# Patient Record
Sex: FEMALE | Race: BLACK OR AFRICAN AMERICAN | NOT HISPANIC OR LATINO | Employment: FULL TIME | URBAN - METROPOLITAN AREA
[De-identification: names, ages, dates, MRNs, and addresses within clinical notes are randomized per-mention and may not be internally consistent; named-entity substitution may affect disease eponyms.]

---

## 2019-10-22 ENCOUNTER — HISTORICAL (OUTPATIENT)
Dept: RADIOLOGY | Facility: HOSPITAL | Age: 59
End: 2019-10-22

## 2020-10-28 ENCOUNTER — HISTORICAL (OUTPATIENT)
Dept: RADIOLOGY | Facility: HOSPITAL | Age: 60
End: 2020-10-28

## 2021-11-03 ENCOUNTER — HISTORICAL (OUTPATIENT)
Dept: RADIOLOGY | Facility: HOSPITAL | Age: 61
End: 2021-11-03

## 2022-03-16 ENCOUNTER — HISTORICAL (OUTPATIENT)
Dept: LAB | Facility: HOSPITAL | Age: 62
End: 2022-03-16

## 2022-03-16 LAB
ABS NEUT (OLG): 2.54 (ref 2.1–9.2)
BASOPHILS # BLD AUTO: 0.1 10*3/UL (ref 0–0.2)
BASOPHILS NFR BLD AUTO: 1 %
CHOLEST SERPL-MCNC: 201 MG/DL
CHOLEST/HDLC SERPL: 4 {RATIO} (ref 0–5)
CRP SERPL-MCNC: 1.4 MG/L
EOSINOPHIL # BLD AUTO: 0.1 10*3/UL (ref 0–0.9)
EOSINOPHIL NFR BLD AUTO: 2 %
ERYTHROCYTE [DISTWIDTH] IN BLOOD BY AUTOMATED COUNT: 14.1 % (ref 11.5–14.5)
ERYTHROCYTE [SEDIMENTATION RATE] IN BLOOD: 23 MM/H (ref 0–20)
EST. AVERAGE GLUCOSE BLD GHB EST-MCNC: 111.2 MG/DL
FLAG2 (OHS): 70
FLAG3 (OHS): 80
FLAGS (OHS): 70
GLUCOSE P FAST SERPL-MCNC: 99 MG/DL (ref 70–115)
HBA1C MFR BLD: 5.5 %
HCT VFR BLD AUTO: 43.7 % (ref 35–46)
HDLC SERPL-MCNC: 56 MG/DL (ref 35–60)
HGB BLD-MCNC: 14.7 G/DL (ref 12–16)
IMM GRANULOCYTES # BLD AUTO: 0.02 10*3/UL
IMM GRANULOCYTES NFR BLD AUTO: 0 %
IMM. NE 2 SUSPECT FLAG (OHS): 10
LDLC SERPL CALC-MCNC: 130 MG/DL (ref 50–140)
LOW EVENT # SUSPECT FLAG (OHS): 70
LYMPHOCYTES # BLD AUTO: 3.5 10*3/UL (ref 0.6–4.6)
LYMPHOCYTES NFR BLD AUTO: 53 %
MANUAL DIFF? (OHS): NO
MCH RBC QN AUTO: 32.9 PG (ref 26–34)
MCHC RBC AUTO-ENTMCNC: 33.6 G/DL (ref 31–37)
MCV RBC AUTO: 97.8 FL (ref 80–100)
MO BLASTS SUSPECT FLAG (OHS): 70
MONOCYTES # BLD AUTO: 0.4 10*3/UL (ref 0.1–1.3)
MONOCYTES NFR BLD AUTO: 6 %
NEUTROPHILS # BLD AUTO: 2.54 10*3/UL (ref 2.1–9.2)
NEUTROPHILS NFR BLD AUTO: 38 %
NRBC BLD AUTO-RTO: 0 % (ref 0–0.2)
PLATELET # BLD AUTO: 237 10*3/UL (ref 130–400)
PLATELET CLUMPS SUSPECT FLAG (OHS): 20
PMV BLD AUTO: 10.1 FL (ref 7.4–10.4)
RBC # BLD AUTO: 4.47 10*6/UL (ref 4–5.2)
TRIGL SERPL-MCNC: 76 MG/DL (ref 37–140)
VLDLC SERPL CALC-MCNC: 15 MG/DL
WBC # SPEC AUTO: 6.6 10*3/UL (ref 4.5–11)

## 2022-03-20 LAB
ANTINUCLEAR ANTIBODY SCREEN (OHS): NEGATIVE
CENTROMERE PROTEIN ANTIBODY (OHS): NEGATIVE
CENTROMERE QUANT (OHS): <0.4
DSDNA AB QUANT (OHS): 1.3
DSDNA ANTIBODY (OHS): NEGATIVE
JO-1 AB QUANT (OHS): <0.3
JO-1 ANTIBODY (OHS): NEGATIVE
RNP70 AB QUANT (OHS): 0.3
RNP70 ANTIBODY (OHS): NEGATIVE
SCL-70S AB QUANT (OHS): <0.6
SCLERODERMA (SCL-70S) ANTIBODY (OHS): NEGATIVE
SMITH AB QUANT (OHS): 1.2
SMITH DP IGG (OHS): NEGATIVE
SSA(RO) AB QUANT (OHS): 0.4
SSA(RO) ANTIBODY (OHS): NEGATIVE
SSB(LA) AB QUANT (OHS): 0.8
SSB(LA) ANTIBODY (OHS): NEGATIVE
U1RNP AB QUANT (OHS): 0.8
U1RNP ANTIBODY (OHS): NEGATIVE

## 2023-05-22 DIAGNOSIS — Z12.31 ENCOUNTER FOR SCREENING MAMMOGRAM FOR MALIGNANT NEOPLASM OF BREAST: Primary | ICD-10-CM

## 2023-06-26 ENCOUNTER — HOSPITAL ENCOUNTER (OUTPATIENT)
Dept: RADIOLOGY | Facility: HOSPITAL | Age: 63
Discharge: HOME | End: 2023-06-26
Attending: NURSE PRACTITIONER
Payer: COMMERCIAL

## 2023-06-26 DIAGNOSIS — Z12.31 ENCOUNTER FOR SCREENING MAMMOGRAM FOR MALIGNANT NEOPLASM OF BREAST: ICD-10-CM

## 2023-06-26 PROCEDURE — 77067 MAMMO DIGITAL SCREENING BILAT WITH TOMO: ICD-10-PCS | Mod: 26,,, | Performed by: RADIOLOGY

## 2023-06-26 PROCEDURE — 77063 BREAST TOMOSYNTHESIS BI: CPT | Mod: 26,,, | Performed by: RADIOLOGY

## 2023-06-26 PROCEDURE — 77067 SCR MAMMO BI INCL CAD: CPT | Mod: TC

## 2023-06-26 PROCEDURE — 77063 MAMMO DIGITAL SCREENING BILAT WITH TOMO: ICD-10-PCS | Mod: 26,,, | Performed by: RADIOLOGY

## 2023-06-26 PROCEDURE — 77067 SCR MAMMO BI INCL CAD: CPT | Mod: 26,,, | Performed by: RADIOLOGY

## 2023-06-28 ENCOUNTER — TELEPHONE (OUTPATIENT)
Dept: RADIOLOGY | Facility: HOSPITAL | Age: 63
End: 2023-06-28
Payer: MEDICAID

## 2023-06-28 NOTE — CARE UPDATE
Diagnostic imaging recommended on recent screening mammogram.  Paper ordering provider.  Faxed request for diagnostic imaging orders. Will contact patient to schedule imaging once order received.

## 2023-07-25 ENCOUNTER — HOSPITAL ENCOUNTER (OUTPATIENT)
Dept: RADIOLOGY | Facility: HOSPITAL | Age: 63
Discharge: HOME | End: 2023-07-25
Attending: NURSE PRACTITIONER
Payer: COMMERCIAL

## 2023-07-25 DIAGNOSIS — R92.8 ABNORMAL SCREENING MAMMOGRAM: ICD-10-CM

## 2023-07-25 PROCEDURE — 77065 DX MAMMO INCL CAD UNI: CPT | Mod: 26,LT,, | Performed by: STUDENT IN AN ORGANIZED HEALTH CARE EDUCATION/TRAINING PROGRAM

## 2023-07-25 PROCEDURE — 77061 BREAST TOMOSYNTHESIS UNI: CPT | Mod: TC,LT

## 2023-07-25 PROCEDURE — 76642 US BREAST LEFT LIMITED: ICD-10-PCS | Mod: 26,LT,, | Performed by: STUDENT IN AN ORGANIZED HEALTH CARE EDUCATION/TRAINING PROGRAM

## 2023-07-25 PROCEDURE — 76642 ULTRASOUND BREAST LIMITED: CPT | Mod: 26,LT,, | Performed by: STUDENT IN AN ORGANIZED HEALTH CARE EDUCATION/TRAINING PROGRAM

## 2023-07-25 PROCEDURE — 76642 ULTRASOUND BREAST LIMITED: CPT | Mod: TC,LT

## 2023-07-25 PROCEDURE — 77065 MAMMO DIGITAL DIAGNOSTIC LEFT WITH TOMO: ICD-10-PCS | Mod: 26,LT,, | Performed by: STUDENT IN AN ORGANIZED HEALTH CARE EDUCATION/TRAINING PROGRAM

## 2023-07-25 PROCEDURE — 77061 BREAST TOMOSYNTHESIS UNI: CPT | Mod: 26,LT,, | Performed by: STUDENT IN AN ORGANIZED HEALTH CARE EDUCATION/TRAINING PROGRAM

## 2023-07-25 PROCEDURE — 77061 MAMMO DIGITAL DIAGNOSTIC LEFT WITH TOMO: ICD-10-PCS | Mod: 26,LT,, | Performed by: STUDENT IN AN ORGANIZED HEALTH CARE EDUCATION/TRAINING PROGRAM

## 2023-08-08 ENCOUNTER — HOSPITAL ENCOUNTER (OUTPATIENT)
Dept: RADIOLOGY | Facility: HOSPITAL | Age: 63
Discharge: HOME | End: 2023-08-08
Attending: NURSE PRACTITIONER
Payer: COMMERCIAL

## 2023-08-08 DIAGNOSIS — R92.8 ABNORMAL FINDINGS ON DIAGNOSTIC IMAGING OF BREAST: ICD-10-CM

## 2023-08-08 DIAGNOSIS — R92.8 ABNORMAL FINDINGS ON DIAGNOSTIC IMAGING OF BREAST: Primary | ICD-10-CM

## 2023-08-08 PROCEDURE — 77061 BREAST TOMOSYNTHESIS UNI: CPT | Mod: 26,LT,, | Performed by: RADIOLOGY

## 2023-08-08 PROCEDURE — 19083 BX BREAST 1ST LESION US IMAG: CPT | Mod: LT,,, | Performed by: RADIOLOGY

## 2023-08-08 PROCEDURE — 19083 US BREAST BIOPSY WITH IMAGING 1ST SITE LEFT: ICD-10-PCS | Mod: LT,,, | Performed by: RADIOLOGY

## 2023-08-08 PROCEDURE — 77065 DX MAMMO INCL CAD UNI: CPT | Mod: 26,LT,, | Performed by: RADIOLOGY

## 2023-08-08 PROCEDURE — 77065 MAMMO DIGITAL DIAGNOSTIC LEFT WITH TOMO: ICD-10-PCS | Mod: 26,LT,, | Performed by: RADIOLOGY

## 2023-08-08 PROCEDURE — 19083 BX BREAST 1ST LESION US IMAG: CPT | Mod: LT

## 2023-08-08 PROCEDURE — 77061 BREAST TOMOSYNTHESIS UNI: CPT | Mod: TC,LT

## 2023-08-08 PROCEDURE — 77061 MAMMO DIGITAL DIAGNOSTIC LEFT WITH TOMO: ICD-10-PCS | Mod: 26,LT,, | Performed by: RADIOLOGY

## 2023-08-10 LAB — PSYCHE PATHOLOGY RESULT: NORMAL

## 2023-08-11 DIAGNOSIS — N64.89 COMPLEX SCLEROSING LESION OF BREAST: Primary | ICD-10-CM

## 2023-08-18 RX ORDER — BUSPIRONE HYDROCHLORIDE 7.5 MG/1
7.5 TABLET ORAL 2 TIMES DAILY
COMMUNITY
Start: 2023-07-04

## 2023-08-18 RX ORDER — VIT C/E/ZN/COPPR/LUTEIN/ZEAXAN 250MG-90MG
1000 CAPSULE ORAL DAILY
COMMUNITY

## 2023-08-18 RX ORDER — LORATADINE 10 MG/1
10 TABLET ORAL
COMMUNITY
Start: 2023-07-04

## 2023-08-18 RX ORDER — FLUTICASONE PROPIONATE 50 MCG
2 SPRAY, SUSPENSION (ML) NASAL
COMMUNITY
Start: 2023-06-05

## 2023-08-18 RX ORDER — LEVOTHYROXINE SODIUM 88 UG/1
88 TABLET ORAL
COMMUNITY
Start: 2023-07-04

## 2023-08-18 NOTE — PROGRESS NOTES
[facility] Breast Surg  Breast Surgical Oncology  New Patient Office Visit - H&P      Care Team: Patient Care Team:  Simran Farrell FNP as PCP - General (Family Medicine)  Pastora Jackson, RN as Registered Nurse (Diagnostic Radiology)   Referring Provider: Paper Order    Chief Complaint:   Chief Complaint   Patient presents with    Breast Mass     Patient states no breast pain, no nipple discharge, no swelling          Subjective:      History of Present Illness:  Kriss Pretty is a pleasant 63 y.o.female who presents as a referral from Nick Farrell NP for high risk breast lesion. She denies any breast changes including new masses, skin changes, skin lesions, pain, nipple discharge or retraction.  Bilateral screening mammogram showed an area of focal asymmetry and architectural distortion in the left breast upper outer quadrant.  Ultrasound showed at 1:00, 7 cm from the nipple a 0.9 cm mass. Biopsy showed complex sclerosing lesion with sclerosing adenosis and prominent apocrine metaplastic changes.  She is never had any biopsies or breast surgery in the past.    Her family history is significant for maternal aunt with breast cancer in her late 50s.  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  She is present at the appointment today with her daughter.      Imaging:   Bilateral screening mammogram 06/26/2023: Density B, BI-RADS 0  focal asymmetry in the left breast upper-outer quadrant, middle to posterior depth.  Architectural distortion is associated with a focal asymmetry.  There is an asymmetry in the left breast medial region, middle depth on CC view.  Left diagnostic mammogram 07/25/2023:  Persistent focal asymmetry with architectural distortion in the left breast upper-outer quadrant, middle to posterior depth.  Ultrasound:  Targeted evaluation of the 1:00 a.m. axis was performed revealing a 1:00, 7 cm from the nipple 0.9 x 0.8 x 0.9 cm irregular, hypoechoic  spiculated mass with posterior acoustic shadowing.  This correlates with a focal asymmetry.  No suspicious left axillary lymphadenopathy.  BI-RADS 4.  Ultrasound-guided biopsy left breast 1:00, 7 cm from the nipple:  Successful.  Coil clip placed in good position.    I have reviewed the imaging and agree with the radiologists interpretation. I have discussed these results with the patient.    Pathology:   Left breast 1:00, 7CFN mass: Complex sclerosing lesion with sclerosing adenosis and prominent apocrine metaplastic changes.    OB / GYN History   Menarche Onset:11  Menopause: Menopause: postmenopausal  Hormonal birth control (duration): 3years  Pregnancies: 3  Age at first child birth: 34  Child births: 2  Hysterectomy/Oophorectomy: no  HRT: no    Family History of Cancer (& age at diagnosis):  -   Family History   Problem Relation Age of Onset    Hypertension Father     Breast cancer Maternal Aunt 55 - 64        Lifestyle:  Height and Weight:   BMI: Body mass index is 40.53 kg/m².     Other:  # of breast biopsies (when and pathology results): 1 current  MG breast density: B  Prior thoracic RT: none  Genetic testing: No  Ashkenazi Baptism descent: No    Other History:     Past Medical History:   Diagnosis Date    Inactive Thyroid         Past Surgical History:   Procedure Laterality Date    BREAST BIOPSY  2023     SECTION          Social History     Socioeconomic History    Marital status:    Tobacco Use    Smoking status: Some Days     Current packs/day: 0.40     Types: Cigarettes    Smokeless tobacco: Never   Substance and Sexual Activity    Alcohol use: Yes     Comment: Occasionally    Drug use: Never    Sexual activity: Not Currently          There is no immunization history on file for this patient.     Medications/Allergies:       Current Outpatient Medications   Medication Instructions    busPIRone (BUSPAR) 7.5 mg, Oral, 2 times daily    cholecalciferol (vitamin D3) (VITAMIN D3)  "1,000 Units, Oral, Daily, Take 2 capsules daily    fluticasone propionate (FLONASE) 50 mcg/actuation nasal spray 2 sprays, Each Nostril    levothyroxine (SYNTHROID) 88 mcg, Oral    loratadine (CLARITIN) 10 mg, Oral       Review of patient's allergies indicates:  No Known Allergies     Review of Systems:      ROS    Constitutional: denies fevers, chills, weight loss  HEENT: denies blurry/double vision, changes in hearing, odynophagia, dysphagia  Respiratory: denies cough, shortness of breath  Cardiovascular: denies palpitations, swelling of the extremities  GI: denies abdominal pain, nausea/vomiting, hematochezia, frequent stools  : denies frequency, dysuria, flank pain, hematuria  Skin: denies new rashes  Neurological: denies muscular/sensory deficiencies, loss of coordination, headaches, memory changes  Endo: denies hair loss/thinning, nervousness, hot flashes, heat/cold intolerance, lumps in the neck area  Heme: denies easy bruising and fatigue  Psychological: denies anxious/depressive moods  Musculoskeletal: denies bony pain, muscle cramps, swollen joints       Objective/Physical Exam   Visit Vitals  BP (!) 143/85 (BP Location: Left arm, Patient Position: Sitting, BP Method: X-Large (Automatic))   Pulse 93   Temp 97.7 °F (36.5 °C) (Oral)   Resp 20   Ht 5' 3" (1.6 m)   Wt 103.8 kg (228 lb 12.8 oz)   SpO2 100%   BMI 40.53 kg/m²        Physical Exam    General: The patient is awake, alert and oriented. The patient is well nourished. No acute distress.  Neck: The neck is supple. The thyroid is not enlarged.  Musculoskeletal: The patient has a normal range of motion of her bilateral upper extremities.  Heart: Regular rate and rhythm, no murmurs.   Lung: No increased work of breathing. Clear breath sounds bilaterally.  Lymph nodes: There is no axillary, supraclavicular or cervical lymphadenopathy.  Breast:  Right:   On inspection there is no skin dimpling, rashes, retraction, erythema or skin abnormalities. The " nipple areolar complex is normal with an everted nipple. The breasts move normally with movement of the pectoralis muscle. On palpation there are no dominant masses.  The breast tissue has a nodularity of 4 out of 10. There is no tenderness. There is no nipple discharge.  Left:   On inspection there is no skin dimpling, rashes, retraction, erythema or skin abnormalities. Biopsy site healing well in the UOQ , some skin reaction from the tape. The nipple areolar complex is normal with an everted nipple. The breasts move normally with movement of the pectoralis muscle. On palpation there are no dominant masses.  The breast tissue has a nodularity of 4 out of 10. There is no tenderness. There is no nipple discharge.     Assessment and Plan     1. Complex sclerosing lesion of breast  - Ambulatory referral/consult to Breast Surgery    2. Tobacco abuse    Kriss Pretty is a 63 y.o.female who presents with a left breast CSL at 1:00, 7CFN.  We discussed that complex sclerosing lesions, also called radial scars, are a pathologic diagnosis, usually discovered incidentally when a breast mass or radiologic abnormality is removed or biopsied.  Surgical excision is recommended when radial scars or complex sclerosing lesions are diagnosed on core biopsy, based on studies showing that around 10% of surgical specimens at subsequent excision are positive for malignancy.  For this reason I would recommend surgical excision.  Excision will be guided by seed localization.  The patient is aware that if any abnormality is found on final pathology, additional surgery and/or other treatment might be recommended. She is aware of the risks, benefits, complications and alternatives associated with the surgery and has had all questions answered.      We did discuss that these lesions do confer a slightly increased risk for breast cancer in the future and her  lifetime risk of breast cancer is about 24%.  She has severe claustrophobia  and is not interested in MRI screening.    She is interested in quitting smoking and is agreeable to the smoking cessation program.      Mila Hanson MD  Breast Surgical Oncology       I spent a total of 45 minutes on the day of the visit.  This includes face to face time and non-face to face time preparing to see the patient (eg, review of tests), obtaining and/or reviewing separately obtained history, documenting clinical information in the electronic or other health record, independently interpreting results and communicating results to the patient/family/caregiver, or care coordinator.

## 2023-08-18 NOTE — H&P (VIEW-ONLY)
[facility] Breast Surg  Breast Surgical Oncology  New Patient Office Visit - H&P      Care Team: Patient Care Team:  Simran Farrell FNP as PCP - General (Family Medicine)  Pastora Jackson, RN as Registered Nurse (Diagnostic Radiology)   Referring Provider: Paper Order    Chief Complaint:   Chief Complaint   Patient presents with    Breast Mass     Patient states no breast pain, no nipple discharge, no swelling          Subjective:      History of Present Illness:  Kriss Pretty is a pleasant 63 y.o.female who presents as a referral from Nick Farrell NP for high risk breast lesion. She denies any breast changes including new masses, skin changes, skin lesions, pain, nipple discharge or retraction.  Bilateral screening mammogram showed an area of focal asymmetry and architectural distortion in the left breast upper outer quadrant.  Ultrasound showed at 1:00, 7 cm from the nipple a 0.9 cm mass. Biopsy showed complex sclerosing lesion with sclerosing adenosis and prominent apocrine metaplastic changes.  She is never had any biopsies or breast surgery in the past.    Her family history is significant for maternal aunt with breast cancer in her late 50s.  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  She is present at the appointment today with her daughter.      Imaging:   Bilateral screening mammogram 06/26/2023: Density B, BI-RADS 0  focal asymmetry in the left breast upper-outer quadrant, middle to posterior depth.  Architectural distortion is associated with a focal asymmetry.  There is an asymmetry in the left breast medial region, middle depth on CC view.  Left diagnostic mammogram 07/25/2023:  Persistent focal asymmetry with architectural distortion in the left breast upper-outer quadrant, middle to posterior depth.  Ultrasound:  Targeted evaluation of the 1:00 a.m. axis was performed revealing a 1:00, 7 cm from the nipple 0.9 x 0.8 x 0.9 cm irregular, hypoechoic  spiculated mass with posterior acoustic shadowing.  This correlates with a focal asymmetry.  No suspicious left axillary lymphadenopathy.  BI-RADS 4.  Ultrasound-guided biopsy left breast 1:00, 7 cm from the nipple:  Successful.  Coil clip placed in good position.    I have reviewed the imaging and agree with the radiologists interpretation. I have discussed these results with the patient.    Pathology:   Left breast 1:00, 7CFN mass: Complex sclerosing lesion with sclerosing adenosis and prominent apocrine metaplastic changes.    OB / GYN History   Menarche Onset:11  Menopause: Menopause: postmenopausal  Hormonal birth control (duration): 3years  Pregnancies: 3  Age at first child birth: 34  Child births: 2  Hysterectomy/Oophorectomy: no  HRT: no    Family History of Cancer (& age at diagnosis):  -   Family History   Problem Relation Age of Onset    Hypertension Father     Breast cancer Maternal Aunt 55 - 64        Lifestyle:  Height and Weight:   BMI: Body mass index is 40.53 kg/m².     Other:  # of breast biopsies (when and pathology results): 1 current  MG breast density: B  Prior thoracic RT: none  Genetic testing: No  Ashkenazi Restorationism descent: No    Other History:     Past Medical History:   Diagnosis Date    Inactive Thyroid         Past Surgical History:   Procedure Laterality Date    BREAST BIOPSY  2023     SECTION          Social History     Socioeconomic History    Marital status:    Tobacco Use    Smoking status: Some Days     Current packs/day: 0.40     Types: Cigarettes    Smokeless tobacco: Never   Substance and Sexual Activity    Alcohol use: Yes     Comment: Occasionally    Drug use: Never    Sexual activity: Not Currently          There is no immunization history on file for this patient.     Medications/Allergies:       Current Outpatient Medications   Medication Instructions    busPIRone (BUSPAR) 7.5 mg, Oral, 2 times daily    cholecalciferol (vitamin D3) (VITAMIN D3)  "1,000 Units, Oral, Daily, Take 2 capsules daily    fluticasone propionate (FLONASE) 50 mcg/actuation nasal spray 2 sprays, Each Nostril    levothyroxine (SYNTHROID) 88 mcg, Oral    loratadine (CLARITIN) 10 mg, Oral       Review of patient's allergies indicates:  No Known Allergies     Review of Systems:      ROS    Constitutional: denies fevers, chills, weight loss  HEENT: denies blurry/double vision, changes in hearing, odynophagia, dysphagia  Respiratory: denies cough, shortness of breath  Cardiovascular: denies palpitations, swelling of the extremities  GI: denies abdominal pain, nausea/vomiting, hematochezia, frequent stools  : denies frequency, dysuria, flank pain, hematuria  Skin: denies new rashes  Neurological: denies muscular/sensory deficiencies, loss of coordination, headaches, memory changes  Endo: denies hair loss/thinning, nervousness, hot flashes, heat/cold intolerance, lumps in the neck area  Heme: denies easy bruising and fatigue  Psychological: denies anxious/depressive moods  Musculoskeletal: denies bony pain, muscle cramps, swollen joints       Objective/Physical Exam   Visit Vitals  BP (!) 143/85 (BP Location: Left arm, Patient Position: Sitting, BP Method: X-Large (Automatic))   Pulse 93   Temp 97.7 °F (36.5 °C) (Oral)   Resp 20   Ht 5' 3" (1.6 m)   Wt 103.8 kg (228 lb 12.8 oz)   SpO2 100%   BMI 40.53 kg/m²        Physical Exam    General: The patient is awake, alert and oriented. The patient is well nourished. No acute distress.  Neck: The neck is supple. The thyroid is not enlarged.  Musculoskeletal: The patient has a normal range of motion of her bilateral upper extremities.  Heart: Regular rate and rhythm, no murmurs.   Lung: No increased work of breathing. Clear breath sounds bilaterally.  Lymph nodes: There is no axillary, supraclavicular or cervical lymphadenopathy.  Breast:  Right:   On inspection there is no skin dimpling, rashes, retraction, erythema or skin abnormalities. The " nipple areolar complex is normal with an everted nipple. The breasts move normally with movement of the pectoralis muscle. On palpation there are no dominant masses.  The breast tissue has a nodularity of 4 out of 10. There is no tenderness. There is no nipple discharge.  Left:   On inspection there is no skin dimpling, rashes, retraction, erythema or skin abnormalities. Biopsy site healing well in the UOQ , some skin reaction from the tape. The nipple areolar complex is normal with an everted nipple. The breasts move normally with movement of the pectoralis muscle. On palpation there are no dominant masses.  The breast tissue has a nodularity of 4 out of 10. There is no tenderness. There is no nipple discharge.     Assessment and Plan     1. Complex sclerosing lesion of breast  - Ambulatory referral/consult to Breast Surgery    2. Tobacco abuse    Kriss Pretty is a 63 y.o.female who presents with a left breast CSL at 1:00, 7CFN.  We discussed that complex sclerosing lesions, also called radial scars, are a pathologic diagnosis, usually discovered incidentally when a breast mass or radiologic abnormality is removed or biopsied.  Surgical excision is recommended when radial scars or complex sclerosing lesions are diagnosed on core biopsy, based on studies showing that around 10% of surgical specimens at subsequent excision are positive for malignancy.  For this reason I would recommend surgical excision.  Excision will be guided by seed localization.  The patient is aware that if any abnormality is found on final pathology, additional surgery and/or other treatment might be recommended. She is aware of the risks, benefits, complications and alternatives associated with the surgery and has had all questions answered.      We did discuss that these lesions do confer a slightly increased risk for breast cancer in the future and her  lifetime risk of breast cancer is about 24%.  She has severe claustrophobia  and is not interested in MRI screening.    She is interested in quitting smoking and is agreeable to the smoking cessation program.      Mila Hanson MD  Breast Surgical Oncology       I spent a total of 45 minutes on the day of the visit.  This includes face to face time and non-face to face time preparing to see the patient (eg, review of tests), obtaining and/or reviewing separately obtained history, documenting clinical information in the electronic or other health record, independently interpreting results and communicating results to the patient/family/caregiver, or care coordinator.

## 2023-08-21 ENCOUNTER — OFFICE VISIT (OUTPATIENT)
Dept: SURGERY | Facility: CLINIC | Age: 63
End: 2023-08-21
Payer: COMMERCIAL

## 2023-08-21 VITALS
WEIGHT: 228.81 LBS | SYSTOLIC BLOOD PRESSURE: 143 MMHG | TEMPERATURE: 98 F | RESPIRATION RATE: 20 BRPM | HEIGHT: 63 IN | OXYGEN SATURATION: 100 % | HEART RATE: 93 BPM | BODY MASS INDEX: 40.54 KG/M2 | DIASTOLIC BLOOD PRESSURE: 85 MMHG

## 2023-08-21 DIAGNOSIS — N64.89 COMPLEX SCLEROSING LESION OF BREAST: ICD-10-CM

## 2023-08-21 DIAGNOSIS — Z72.0 TOBACCO ABUSE: Primary | ICD-10-CM

## 2023-08-21 PROCEDURE — 3077F SYST BP >= 140 MM HG: CPT | Mod: CPTII,S$GLB,, | Performed by: STUDENT IN AN ORGANIZED HEALTH CARE EDUCATION/TRAINING PROGRAM

## 2023-08-21 PROCEDURE — 3008F BODY MASS INDEX DOCD: CPT | Mod: CPTII,S$GLB,, | Performed by: STUDENT IN AN ORGANIZED HEALTH CARE EDUCATION/TRAINING PROGRAM

## 2023-08-21 PROCEDURE — 99204 OFFICE O/P NEW MOD 45 MIN: CPT | Mod: S$GLB,,, | Performed by: STUDENT IN AN ORGANIZED HEALTH CARE EDUCATION/TRAINING PROGRAM

## 2023-08-21 PROCEDURE — 99204 PR OFFICE/OUTPT VISIT, NEW, LEVL IV, 45-59 MIN: ICD-10-PCS | Mod: S$GLB,,, | Performed by: STUDENT IN AN ORGANIZED HEALTH CARE EDUCATION/TRAINING PROGRAM

## 2023-08-21 PROCEDURE — 99999 PR PBB SHADOW E&M-EST. PATIENT-LVL IV: CPT | Mod: PBBFAC,,, | Performed by: STUDENT IN AN ORGANIZED HEALTH CARE EDUCATION/TRAINING PROGRAM

## 2023-08-21 PROCEDURE — 1159F PR MEDICATION LIST DOCUMENTED IN MEDICAL RECORD: ICD-10-PCS | Mod: CPTII,S$GLB,, | Performed by: STUDENT IN AN ORGANIZED HEALTH CARE EDUCATION/TRAINING PROGRAM

## 2023-08-21 PROCEDURE — 1160F PR REVIEW ALL MEDS BY PRESCRIBER/CLIN PHARMACIST DOCUMENTED: ICD-10-PCS | Mod: CPTII,S$GLB,, | Performed by: STUDENT IN AN ORGANIZED HEALTH CARE EDUCATION/TRAINING PROGRAM

## 2023-08-21 PROCEDURE — 3008F PR BODY MASS INDEX (BMI) DOCUMENTED: ICD-10-PCS | Mod: CPTII,S$GLB,, | Performed by: STUDENT IN AN ORGANIZED HEALTH CARE EDUCATION/TRAINING PROGRAM

## 2023-08-21 PROCEDURE — 1160F RVW MEDS BY RX/DR IN RCRD: CPT | Mod: CPTII,S$GLB,, | Performed by: STUDENT IN AN ORGANIZED HEALTH CARE EDUCATION/TRAINING PROGRAM

## 2023-08-21 PROCEDURE — 3079F DIAST BP 80-89 MM HG: CPT | Mod: CPTII,S$GLB,, | Performed by: STUDENT IN AN ORGANIZED HEALTH CARE EDUCATION/TRAINING PROGRAM

## 2023-08-21 PROCEDURE — 3079F PR MOST RECENT DIASTOLIC BLOOD PRESSURE 80-89 MM HG: ICD-10-PCS | Mod: CPTII,S$GLB,, | Performed by: STUDENT IN AN ORGANIZED HEALTH CARE EDUCATION/TRAINING PROGRAM

## 2023-08-21 PROCEDURE — 99999 PR PBB SHADOW E&M-EST. PATIENT-LVL IV: ICD-10-PCS | Mod: PBBFAC,,, | Performed by: STUDENT IN AN ORGANIZED HEALTH CARE EDUCATION/TRAINING PROGRAM

## 2023-08-21 PROCEDURE — 1159F MED LIST DOCD IN RCRD: CPT | Mod: CPTII,S$GLB,, | Performed by: STUDENT IN AN ORGANIZED HEALTH CARE EDUCATION/TRAINING PROGRAM

## 2023-08-21 PROCEDURE — 3077F PR MOST RECENT SYSTOLIC BLOOD PRESSURE >= 140 MM HG: ICD-10-PCS | Mod: CPTII,S$GLB,, | Performed by: STUDENT IN AN ORGANIZED HEALTH CARE EDUCATION/TRAINING PROGRAM

## 2023-09-05 DIAGNOSIS — N64.89 COMPLEX SCLEROSING LESION OF LEFT BREAST: ICD-10-CM

## 2023-09-05 DIAGNOSIS — N64.89 COMPLEX SCLEROSING LESION OF BREAST: Primary | ICD-10-CM

## 2023-09-05 RX ORDER — SODIUM CHLORIDE, SODIUM LACTATE, POTASSIUM CHLORIDE, CALCIUM CHLORIDE 600; 310; 30; 20 MG/100ML; MG/100ML; MG/100ML; MG/100ML
INJECTION, SOLUTION INTRAVENOUS CONTINUOUS
Status: CANCELLED | OUTPATIENT
Start: 2023-09-05

## 2023-09-11 ENCOUNTER — HOSPITAL ENCOUNTER (OUTPATIENT)
Dept: RADIOLOGY | Facility: HOSPITAL | Age: 63
Discharge: HOME | End: 2023-09-11
Attending: STUDENT IN AN ORGANIZED HEALTH CARE EDUCATION/TRAINING PROGRAM
Payer: COMMERCIAL

## 2023-09-11 DIAGNOSIS — N64.89 COMPLEX SCLEROSING LESION OF BREAST: ICD-10-CM

## 2023-09-11 DIAGNOSIS — N64.89 COMPLEX SCLEROSING LESION OF LEFT BREAST: ICD-10-CM

## 2023-09-11 PROCEDURE — 77065 DX MAMMO INCL CAD UNI: CPT | Mod: 26,LT,, | Performed by: STUDENT IN AN ORGANIZED HEALTH CARE EDUCATION/TRAINING PROGRAM

## 2023-09-11 PROCEDURE — 19285 PERQ DEV BREAST 1ST US IMAG: CPT | Mod: LT

## 2023-09-11 PROCEDURE — 71046 X-RAY EXAM CHEST 2 VIEWS: CPT | Mod: TC

## 2023-09-11 PROCEDURE — 19285 PERQ DEV BREAST 1ST US IMAG: CPT | Mod: LT,,, | Performed by: STUDENT IN AN ORGANIZED HEALTH CARE EDUCATION/TRAINING PROGRAM

## 2023-09-11 PROCEDURE — 19285 US LEFT MAGSEED LOCALIZATION (BREAST IMAGING): ICD-10-PCS | Mod: LT,,, | Performed by: STUDENT IN AN ORGANIZED HEALTH CARE EDUCATION/TRAINING PROGRAM

## 2023-09-11 PROCEDURE — 77061 BREAST TOMOSYNTHESIS UNI: CPT | Mod: 26,LT,, | Performed by: STUDENT IN AN ORGANIZED HEALTH CARE EDUCATION/TRAINING PROGRAM

## 2023-09-11 PROCEDURE — 77061 MAMMO DIGITAL DIAGNOSTIC LEFT WITH TOMO: ICD-10-PCS | Mod: 26,LT,, | Performed by: STUDENT IN AN ORGANIZED HEALTH CARE EDUCATION/TRAINING PROGRAM

## 2023-09-11 PROCEDURE — 77065 PR MAMMO, CAD, DIAGNOSTIC, UNILAT: ICD-10-PCS | Mod: 26,LT,, | Performed by: STUDENT IN AN ORGANIZED HEALTH CARE EDUCATION/TRAINING PROGRAM

## 2023-09-11 PROCEDURE — 77061 BREAST TOMOSYNTHESIS UNI: CPT | Mod: TC,LT

## 2023-09-14 RX ORDER — LIDOCAINE HYDROCHLORIDE 10 MG/ML
1 INJECTION, SOLUTION EPIDURAL; INFILTRATION; INTRACAUDAL; PERINEURAL ONCE AS NEEDED
Status: CANCELLED | OUTPATIENT
Start: 2023-09-15 | End: 2035-02-10

## 2023-09-14 RX ORDER — SODIUM CHLORIDE 9 MG/ML
INJECTION, SOLUTION INTRAVENOUS CONTINUOUS
Status: CANCELLED | OUTPATIENT
Start: 2023-09-15

## 2023-09-15 ENCOUNTER — HOSPITAL ENCOUNTER (OUTPATIENT)
Facility: HOSPITAL | Age: 63
Discharge: HOME | End: 2023-09-15
Attending: STUDENT IN AN ORGANIZED HEALTH CARE EDUCATION/TRAINING PROGRAM | Admitting: STUDENT IN AN ORGANIZED HEALTH CARE EDUCATION/TRAINING PROGRAM
Payer: COMMERCIAL

## 2023-09-15 ENCOUNTER — ANESTHESIA EVENT (OUTPATIENT)
Dept: SURGERY | Facility: HOSPITAL | Age: 63
End: 2023-09-15
Payer: COMMERCIAL

## 2023-09-15 ENCOUNTER — HOSPITAL ENCOUNTER (OUTPATIENT)
Dept: RADIOLOGY | Facility: HOSPITAL | Age: 63
Discharge: HOME | End: 2023-09-15
Attending: STUDENT IN AN ORGANIZED HEALTH CARE EDUCATION/TRAINING PROGRAM
Payer: COMMERCIAL

## 2023-09-15 ENCOUNTER — ANESTHESIA (OUTPATIENT)
Dept: SURGERY | Facility: HOSPITAL | Age: 63
End: 2023-09-15
Payer: COMMERCIAL

## 2023-09-15 DIAGNOSIS — R92.8 ABNORMAL MAMMOGRAM: ICD-10-CM

## 2023-09-15 DIAGNOSIS — N64.89 COMPLEX SCLEROSING LESION OF BREAST: ICD-10-CM

## 2023-09-15 DIAGNOSIS — N64.89 COMPLEX SCLEROSING LESION OF LEFT BREAST: ICD-10-CM

## 2023-09-15 PROCEDURE — 37000008 HC ANESTHESIA 1ST 15 MINUTES: Performed by: STUDENT IN AN ORGANIZED HEALTH CARE EDUCATION/TRAINING PROGRAM

## 2023-09-15 PROCEDURE — D9220A PRA ANESTHESIA: Mod: ANES,,, | Performed by: ANESTHESIOLOGY

## 2023-09-15 PROCEDURE — 71000033 HC RECOVERY, INTIAL HOUR: Performed by: STUDENT IN AN ORGANIZED HEALTH CARE EDUCATION/TRAINING PROGRAM

## 2023-09-15 PROCEDURE — 19301 PARTIAL MASTECTOMY: CPT | Mod: LT,,, | Performed by: STUDENT IN AN ORGANIZED HEALTH CARE EDUCATION/TRAINING PROGRAM

## 2023-09-15 PROCEDURE — 76098 X-RAY EXAM SURGICAL SPECIMEN: CPT | Mod: 26,,, | Performed by: RADIOLOGY

## 2023-09-15 PROCEDURE — 71000016 HC POSTOP RECOV ADDL HR: Performed by: STUDENT IN AN ORGANIZED HEALTH CARE EDUCATION/TRAINING PROGRAM

## 2023-09-15 PROCEDURE — 63600175 PHARM REV CODE 636 W HCPCS: Performed by: NURSE ANESTHETIST, CERTIFIED REGISTERED

## 2023-09-15 PROCEDURE — 19301 PARTIAL MASTECTOMY: CPT | Mod: AS,LT,, | Performed by: PHYSICIAN ASSISTANT

## 2023-09-15 PROCEDURE — 76098 MAMMO BREAST SPECIMEN: ICD-10-PCS | Mod: 26,,, | Performed by: RADIOLOGY

## 2023-09-15 PROCEDURE — 37000009 HC ANESTHESIA EA ADD 15 MINS: Performed by: STUDENT IN AN ORGANIZED HEALTH CARE EDUCATION/TRAINING PROGRAM

## 2023-09-15 PROCEDURE — 19301 PR MASTECTOMY, PARTIAL: ICD-10-PCS | Mod: LT,,, | Performed by: STUDENT IN AN ORGANIZED HEALTH CARE EDUCATION/TRAINING PROGRAM

## 2023-09-15 PROCEDURE — A4216 STERILE WATER/SALINE, 10 ML: HCPCS | Performed by: STUDENT IN AN ORGANIZED HEALTH CARE EDUCATION/TRAINING PROGRAM

## 2023-09-15 PROCEDURE — D9220A PRA ANESTHESIA: Mod: CRNA,,, | Performed by: NURSE ANESTHETIST, CERTIFIED REGISTERED

## 2023-09-15 PROCEDURE — D9220A PRA ANESTHESIA: ICD-10-PCS | Mod: CRNA,,, | Performed by: NURSE ANESTHETIST, CERTIFIED REGISTERED

## 2023-09-15 PROCEDURE — 76098 X-RAY EXAM SURGICAL SPECIMEN: CPT | Mod: TC

## 2023-09-15 PROCEDURE — 63600175 PHARM REV CODE 636 W HCPCS: Performed by: ANESTHESIOLOGY

## 2023-09-15 PROCEDURE — 25000003 PHARM REV CODE 250: Performed by: NURSE ANESTHETIST, CERTIFIED REGISTERED

## 2023-09-15 PROCEDURE — 19301 PR MASTECTOMY, PARTIAL: ICD-10-PCS | Mod: AS,LT,, | Performed by: PHYSICIAN ASSISTANT

## 2023-09-15 PROCEDURE — 63600175 PHARM REV CODE 636 W HCPCS: Performed by: STUDENT IN AN ORGANIZED HEALTH CARE EDUCATION/TRAINING PROGRAM

## 2023-09-15 PROCEDURE — 71000015 HC POSTOP RECOV 1ST HR: Performed by: STUDENT IN AN ORGANIZED HEALTH CARE EDUCATION/TRAINING PROGRAM

## 2023-09-15 PROCEDURE — 36000707: Performed by: STUDENT IN AN ORGANIZED HEALTH CARE EDUCATION/TRAINING PROGRAM

## 2023-09-15 PROCEDURE — D9220A PRA ANESTHESIA: ICD-10-PCS | Mod: ANES,,, | Performed by: ANESTHESIOLOGY

## 2023-09-15 PROCEDURE — 25000003 PHARM REV CODE 250: Performed by: ANESTHESIOLOGY

## 2023-09-15 PROCEDURE — 36000706: Performed by: STUDENT IN AN ORGANIZED HEALTH CARE EDUCATION/TRAINING PROGRAM

## 2023-09-15 PROCEDURE — 25000003 PHARM REV CODE 250: Performed by: STUDENT IN AN ORGANIZED HEALTH CARE EDUCATION/TRAINING PROGRAM

## 2023-09-15 RX ORDER — ONDANSETRON 2 MG/ML
4 INJECTION INTRAMUSCULAR; INTRAVENOUS ONCE
Status: CANCELLED | OUTPATIENT
Start: 2023-09-15 | End: 2023-09-15

## 2023-09-15 RX ORDER — CELECOXIB 200 MG/1
400 CAPSULE ORAL ONCE
Status: DISCONTINUED | OUTPATIENT
Start: 2023-09-15 | End: 2023-09-15

## 2023-09-15 RX ORDER — ONDANSETRON 2 MG/ML
4 INJECTION INTRAMUSCULAR; INTRAVENOUS EVERY 12 HOURS PRN
Status: DISCONTINUED | OUTPATIENT
Start: 2023-09-15 | End: 2023-09-15 | Stop reason: HOSPADM

## 2023-09-15 RX ORDER — ACETAMINOPHEN 325 MG/1
650 TABLET ORAL
Status: COMPLETED | OUTPATIENT
Start: 2023-09-15 | End: 2023-09-15

## 2023-09-15 RX ORDER — DEXAMETHASONE SODIUM PHOSPHATE 4 MG/ML
INJECTION, SOLUTION INTRA-ARTICULAR; INTRALESIONAL; INTRAMUSCULAR; INTRAVENOUS; SOFT TISSUE
Status: DISCONTINUED | OUTPATIENT
Start: 2023-09-15 | End: 2023-09-15

## 2023-09-15 RX ORDER — HYDROMORPHONE HYDROCHLORIDE 2 MG/ML
0.4 INJECTION, SOLUTION INTRAMUSCULAR; INTRAVENOUS; SUBCUTANEOUS EVERY 5 MIN PRN
Status: ACTIVE | OUTPATIENT
Start: 2023-09-15 | End: 2023-09-15

## 2023-09-15 RX ORDER — CEFAZOLIN SODIUM 2 G/50ML
2 SOLUTION INTRAVENOUS
Status: COMPLETED | OUTPATIENT
Start: 2023-09-15 | End: 2023-09-15

## 2023-09-15 RX ORDER — SODIUM CHLORIDE, SODIUM LACTATE, POTASSIUM CHLORIDE, CALCIUM CHLORIDE 600; 310; 30; 20 MG/100ML; MG/100ML; MG/100ML; MG/100ML
INJECTION, SOLUTION INTRAVENOUS CONTINUOUS
Status: DISCONTINUED | OUTPATIENT
Start: 2023-09-15 | End: 2023-09-15 | Stop reason: HOSPADM

## 2023-09-15 RX ORDER — LIDOCAINE HYDROCHLORIDE 10 MG/ML
INJECTION, SOLUTION EPIDURAL; INFILTRATION; INTRACAUDAL; PERINEURAL
Status: DISCONTINUED | OUTPATIENT
Start: 2023-09-15 | End: 2023-09-15

## 2023-09-15 RX ORDER — CEFAZOLIN SODIUM 1 G/3ML
INJECTION, POWDER, FOR SOLUTION INTRAMUSCULAR; INTRAVENOUS
Status: DISCONTINUED
Start: 2023-09-15 | End: 2023-09-15 | Stop reason: HOSPADM

## 2023-09-15 RX ORDER — BUPIVACAINE HYDROCHLORIDE 5 MG/ML
INJECTION, SOLUTION EPIDURAL; INTRACAUDAL
Status: DISCONTINUED
Start: 2023-09-15 | End: 2023-09-15 | Stop reason: HOSPADM

## 2023-09-15 RX ORDER — SODIUM CHLORIDE 9 MG/ML
INJECTION, SOLUTION INTRAMUSCULAR; INTRAVENOUS; SUBCUTANEOUS
Status: DISCONTINUED
Start: 2023-09-15 | End: 2023-09-15 | Stop reason: HOSPADM

## 2023-09-15 RX ORDER — TRAMADOL HYDROCHLORIDE 50 MG/1
50 TABLET ORAL EVERY 4 HOURS PRN
Status: DISCONTINUED | OUTPATIENT
Start: 2023-09-15 | End: 2023-09-15 | Stop reason: HOSPADM

## 2023-09-15 RX ORDER — MEPERIDINE HYDROCHLORIDE 25 MG/ML
12.5 INJECTION INTRAMUSCULAR; INTRAVENOUS; SUBCUTANEOUS EVERY 10 MIN PRN
Status: DISCONTINUED | OUTPATIENT
Start: 2023-09-15 | End: 2023-09-15 | Stop reason: HOSPADM

## 2023-09-15 RX ORDER — SODIUM CHLORIDE, SODIUM LACTATE, POTASSIUM CHLORIDE, CALCIUM CHLORIDE 600; 310; 30; 20 MG/100ML; MG/100ML; MG/100ML; MG/100ML
INJECTION, SOLUTION INTRAVENOUS CONTINUOUS
Status: ACTIVE | OUTPATIENT
Start: 2023-09-15 | End: 2023-09-15

## 2023-09-15 RX ORDER — OXYCODONE HYDROCHLORIDE 5 MG/1
5 TABLET ORAL EVERY 6 HOURS PRN
Qty: 6 TABLET | Refills: 0 | Status: SHIPPED | OUTPATIENT
Start: 2023-09-15

## 2023-09-15 RX ORDER — CEFAZOLIN 2 G/1
INJECTION, POWDER, FOR SOLUTION INTRAMUSCULAR; INTRAVENOUS
Status: DISCONTINUED
Start: 2023-09-15 | End: 2023-09-15 | Stop reason: HOSPADM

## 2023-09-15 RX ORDER — PROPOFOL 10 MG/ML
VIAL (ML) INTRAVENOUS
Status: DISCONTINUED | OUTPATIENT
Start: 2023-09-15 | End: 2023-09-15

## 2023-09-15 RX ORDER — HYDROMORPHONE HYDROCHLORIDE 2 MG/ML
0.4 INJECTION, SOLUTION INTRAMUSCULAR; INTRAVENOUS; SUBCUTANEOUS EVERY 10 MIN PRN
Status: DISCONTINUED | OUTPATIENT
Start: 2023-09-15 | End: 2023-09-15

## 2023-09-15 RX ORDER — CEFAZOLIN SODIUM 1 G/3ML
INJECTION, POWDER, FOR SOLUTION INTRAMUSCULAR; INTRAVENOUS
Status: DISCONTINUED | OUTPATIENT
Start: 2023-09-15 | End: 2023-09-15 | Stop reason: HOSPADM

## 2023-09-15 RX ORDER — ONDANSETRON 2 MG/ML
4 INJECTION INTRAMUSCULAR; INTRAVENOUS ONCE AS NEEDED
Status: COMPLETED | OUTPATIENT
Start: 2023-09-15 | End: 2023-09-15

## 2023-09-15 RX ORDER — ONDANSETRON HYDROCHLORIDE 2 MG/ML
INJECTION, SOLUTION INTRAMUSCULAR; INTRAVENOUS
Status: DISCONTINUED | OUTPATIENT
Start: 2023-09-15 | End: 2023-09-15

## 2023-09-15 RX ORDER — BUPIVACAINE HYDROCHLORIDE 5 MG/ML
INJECTION, SOLUTION EPIDURAL; INTRACAUDAL
Status: DISCONTINUED | OUTPATIENT
Start: 2023-09-15 | End: 2023-09-15 | Stop reason: HOSPADM

## 2023-09-15 RX ORDER — SODIUM CHLORIDE 9 MG/ML
INJECTION, SOLUTION INTRAMUSCULAR; INTRAVENOUS; SUBCUTANEOUS
Status: DISCONTINUED | OUTPATIENT
Start: 2023-09-15 | End: 2023-09-15 | Stop reason: HOSPADM

## 2023-09-15 RX ORDER — MIDAZOLAM HYDROCHLORIDE 1 MG/ML
2 INJECTION INTRAMUSCULAR; INTRAVENOUS ONCE
Status: COMPLETED | OUTPATIENT
Start: 2023-09-15 | End: 2023-09-15

## 2023-09-15 RX ORDER — FENTANYL CITRATE 50 UG/ML
INJECTION, SOLUTION INTRAMUSCULAR; INTRAVENOUS
Status: DISCONTINUED | OUTPATIENT
Start: 2023-09-15 | End: 2023-09-15

## 2023-09-15 RX ORDER — METOCLOPRAMIDE 10 MG/1
10 TABLET ORAL
Status: COMPLETED | OUTPATIENT
Start: 2023-09-15 | End: 2023-09-15

## 2023-09-15 RX ORDER — PHENYLEPHRINE HYDROCHLORIDE 10 MG/ML
INJECTION INTRAVENOUS
Status: DISCONTINUED | OUTPATIENT
Start: 2023-09-15 | End: 2023-09-15

## 2023-09-15 RX ORDER — FAMOTIDINE 20 MG/1
40 TABLET, FILM COATED ORAL ONCE
Status: COMPLETED | OUTPATIENT
Start: 2023-09-15 | End: 2023-09-15

## 2023-09-15 RX ADMIN — LIDOCAINE HYDROCHLORIDE 20 MG: 10 INJECTION, SOLUTION EPIDURAL; INFILTRATION; INTRACAUDAL; PERINEURAL at 11:09

## 2023-09-15 RX ADMIN — FENTANYL CITRATE 50 MCG: 50 INJECTION, SOLUTION INTRAMUSCULAR; INTRAVENOUS at 12:09

## 2023-09-15 RX ADMIN — ONDANSETRON 4 MG: 2 INJECTION INTRAMUSCULAR; INTRAVENOUS at 12:09

## 2023-09-15 RX ADMIN — MIDAZOLAM HYDROCHLORIDE 2 MG: 1 INJECTION, SOLUTION INTRAMUSCULAR; INTRAVENOUS at 09:09

## 2023-09-15 RX ADMIN — ONDANSETRON 4 MG: 2 INJECTION INTRAMUSCULAR; INTRAVENOUS at 09:09

## 2023-09-15 RX ADMIN — CEFAZOLIN SODIUM 2 G: 2 SOLUTION INTRAVENOUS at 11:09

## 2023-09-15 RX ADMIN — PROPOFOL 150 MG: 10 INJECTION, EMULSION INTRAVENOUS at 11:09

## 2023-09-15 RX ADMIN — METOCLOPRAMIDE 10 MG: 10 TABLET ORAL at 09:09

## 2023-09-15 RX ADMIN — ACETAMINOPHEN 650 MG: 325 TABLET, FILM COATED ORAL at 09:09

## 2023-09-15 RX ADMIN — PHENYLEPHRINE HYDROCHLORIDE 100 MCG: 10 INJECTION INTRAVENOUS at 12:09

## 2023-09-15 RX ADMIN — SODIUM CHLORIDE, POTASSIUM CHLORIDE, SODIUM LACTATE AND CALCIUM CHLORIDE: 600; 310; 30; 20 INJECTION, SOLUTION INTRAVENOUS at 09:09

## 2023-09-15 RX ADMIN — FENTANYL CITRATE 100 MCG: 50 INJECTION, SOLUTION INTRAMUSCULAR; INTRAVENOUS at 11:09

## 2023-09-15 RX ADMIN — DEXAMETHASONE SODIUM PHOSPHATE 4 MG: 4 INJECTION, SOLUTION INTRA-ARTICULAR; INTRALESIONAL; INTRAMUSCULAR; INTRAVENOUS; SOFT TISSUE at 11:09

## 2023-09-15 RX ADMIN — FAMOTIDINE 40 MG: 20 TABLET, FILM COATED ORAL at 09:09

## 2023-09-15 RX ADMIN — SODIUM CHLORIDE, POTASSIUM CHLORIDE, SODIUM LACTATE AND CALCIUM CHLORIDE: 600; 310; 30; 20 INJECTION, SOLUTION INTRAVENOUS at 01:09

## 2023-09-15 NOTE — DISCHARGE INSTRUCTIONS
Post-operative Instructions    ü Do not drive, operate machinery, or any hazardous activity for at least 24 hours following  anesthesia or sedation and while taking narcotics. You may drive only once you are pain-  free and moving quickly and freely and without hesitation or discomfort.    ü Do not drink alcohol for at least 24 hours and while taking narcotics    ü Do not sign important papers or make important business decisions for 24 hours following  your surgery    ü Do not stay alone for 24 hours following surgery    Care at Home:    ü Advance diet as tolerated    ü Follow exercises on information sheet as tolerated    ü You may shower on postoperative day #1. Remove outer dressings and shower as you  normally would. If you have one or more drainage catheters, see below. Pat dry and apply  clean pads as needed after showering. Try to avoid using tape. If you have steri-strips,  leave them on until they fall off, or remove gently in 7-10 days. Do not soak in a tub or go  swimming for 2 weeks post surgery.    ü No lifting more than 10 lbs or strenuous physical activity prior to post-operative appointment    ü Move arms/ shoulders freely but no overhead lifting until instructed so at post-op or with  physical therapy    ü Wear your bra or ace wrap for 5-7 days - may be removed to wash and change clothes.    Pain Management    o Take acetaminophen (Tylenol) or ibuprofen (Motrin and others) or naproxen (Aleve) as  needed for pain. Follow the instructions on the bottle regarding dosage and daily limits.  You may alternate acetaminophen and either ibuprofen or naproxen if needed. Do not  take more than 3250 mg of acetaminophen (10 regular strength or 6 extra strength  tablets) in any 24 hour period.    o If you were provided a prescription for a narcotic you may take that instead of the  acetaminophen. If the narcotic contains acetaminophen (such as hydrocodone/APAP,  Norco, Vicodin or Percocet) be aware of the total  daily limit on acetaminophen.    o If you were not provided a prescription for a narcotic and feel that your pain is not  adequately controlled, call the Breast Center and a prescription can be sent to your  Pharmacy    o Please do not take more than 8 oxycodone or hydrocodone tablets per day and not take  additional Tylenol (acetaminophen) while on Percocet, Norco, Lortab or other medication  containing Tylenol.    o Narcotic pain medication can cause constipation. Please use over the counter  medication (Senakot S, Milk of Mag, Prune Juice, Michelle-Colace, etc.) to prevent and  alleviate constipation post operatively.    o Do not take any aspirin until postoperative day #2..    o If you do get constipated, remember that Colace and other stool softeners are NOT  laxatives. If you have not had a bowel movement in a few days or become bloated you  will need to use something stronger. You may try prune juice or a mild laxative that has  worked for you in the past, but you will very likely require something stronger. A daily  dose of Miralax works very well for most patients. The most effective and quickest  acting solution is usually a suppository or enema. If you are constipated, you should  consider sending someone to the pharmacy for an over the counter suppository  (Dulcolax or similar products) and an over the counter enema (Fleets enema or other  products). Try one and then the other if needed. If nothing provides relief, contact the  [facility] Center.    Call the Breast Center [phone] for the following:  ü Temperature greater than 101.4 degrees F.  ü For uncontrolled pain  ü Excessive bleeding, swelling, or pain at surgical site  ü Persistent nausea or vomiting  ü Foul smelling discharge from surgical wounds

## 2023-09-15 NOTE — ANESTHESIA PREPROCEDURE EVALUATION
"                                                                                                             09/15/2023  Kriss Pretty is a 63 y.o., female.  Pre-operative evaluation for Procedure(s) (LRB):  EXCISION,MASS,BREAST,USING RADIOLOGICAL MARKER - Magseed Localization  Left need ultrasound, need faxitron, need sentimag (Left)    Kriss Pretty is a 63 y.o. female     There is no problem list on file for this patient.      Review of patient's allergies indicates:  No Known Allergies    No current facility-administered medications on file prior to encounter.     Current Outpatient Medications on File Prior to Encounter   Medication Sig Dispense Refill    busPIRone (BUSPAR) 7.5 MG tablet Take 7.5 mg by mouth 2 (two) times daily.      cholecalciferol, vitamin D3, (VITAMIN D3) 25 mcg (1,000 unit) capsule Take 1,000 Units by mouth once daily. Take 2 capsules daily      fluticasone propionate (FLONASE) 50 mcg/actuation nasal spray 2 sprays by Each Nostril route.      levothyroxine (SYNTHROID) 88 MCG tablet Take 88 mcg by mouth.      loratadine (CLARITIN) 10 mg tablet Take 10 mg by mouth.         Past Surgical History:   Procedure Laterality Date    BREAST BIOPSY  2023     SECTION       CBC: WNL     No results for input(s): "WBC", "RBC", "HGB", "HCT", "PLT", "MCV", "MCH", "MCHC" in the last 72 hours.    CMP: WNL     No results for input(s): "NA", "K", "CL", "CO2", "BUN", "CREATININE", "GLU", "MG", "PHOS", "CALCIUM", "ALBUMIN", "PROT", "ALKPHOS", "ALT", "AST", "BILITOT" in the last 72 hours.    Diagnostic Studies:  CXR : NAPD    EKG 23: NSR=61.    2D Echo :  No results found for this or any previous visit.      Pre-op Assessment    I have reviewed the Patient Summary Reports.     I have reviewed the Nursing Notes. I have reviewed the NPO Status.   I have reviewed the Medications.     Review of Systems  Anesthesia Hx:  No problems with previous Anesthesia Denies " Glaucoma. History of prior surgery of interest to airway management or planning: Denies Family Hx of Anesthesia complications.   Denies Personal Hx of Anesthesia complications.   Social:  Smoker, Social Alcohol Use 1/2PPD x 30+yrs .    Hematology/Oncology:  Hematology Normal   Oncology Normal     EENT/Dental:   chronic allergic rhinitis   Cardiovascular:  Cardiovascular Normal  Denies Pacemaker.  Denies CAD.    Denies CABG/stent.   Denies Angina. ECG has been reviewed.    Pulmonary:  Pulmonary Normal  Denies COPD.  Denies Asthma.  Denies Shortness of breath.  Denies Sleep Apnea.    Renal/:  Renal/ Normal     Hepatic/GI:  Hepatic/GI Normal  Denies GERD.    Musculoskeletal:  Musculoskeletal Normal    Neurological:  Neurology Normal  Denies CVA. Denies Seizures.    Endocrine:   Hypothyroidism  Morbid Obesity / BMI > 40  Dermatological:  Skin Normal    Psych:   anxiety          Physical Exam  General: Cooperative, Alert, Oriented and Anxious    Airway:  Mallampati: III / III  Mouth Opening: Normal  TM Distance: > 6 cm  Tongue: Normal  Neck ROM: Normal ROM    Dental:  Edentulous, Dentures  Px denies any loose teeth.  Chest/Lungs:  Clear to auscultation, Normal Respiratory Rate    Heart:  Rate: Normal  Rhythm: Regular Rhythm    Abdomen:  Normal, Soft        Anesthesia Plan  Type of Anesthesia, risks & benefits discussed:    Anesthesia Type: Gen ETT, Gen Supraglottic Airway  Intra-op Monitoring Plan: Standard ASA Monitors  Post Op Pain Control Plan: multimodal analgesia  Induction:  IV  Airway Plan: Direct  Informed Consent: Informed consent signed with the Patient and all parties understand the risks and agree with anesthesia plan.  All questions answered.   ASA Score: 3  Day of Surgery Review of History & Physical: H&P Update referred to the surgeon/provider.I have interviewed and examined the patient. I have reviewed the patient's H&P dated:     Ready For Surgery From Anesthesia Perspective.     .

## 2023-09-15 NOTE — CARE UPDATE
Received patient from the OR, she is arousing upon pacu arrival,martinez, oriented/reassured her, she denied c/o, respirations full-regular-deep-clear, hob up 30 degrees.

## 2023-09-15 NOTE — ANESTHESIA PROCEDURE NOTES
Intubation    Date/Time: 9/15/2023 11:45 AM    Performed by: Sole Hernandez CRNA  Authorized by: Coleen Santana MD    Intubation:     Induction:  Intravenous    Intubated:  Postinduction    Mask Ventilation:  Easy mask    Attempts:  1    Attempted By:  CRNA    Difficult Airway Encountered?: No      Airway Device:  Supraglottic airway/LMA    Airway Device Size:  3.0    Style/Cuff Inflation:  Cuffed (inflated to minimal occlusive pressure)    Inflation Amount (mL):  18    Placement Verified By:  Capnometry    Complicating Factors:  None    Findings Post-Intubation:  BS equal bilateral

## 2023-09-15 NOTE — OP NOTE
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 Left breast excisional biopsy with MagSeed localization     EXCISION,MASS,BREAST,USING RADIOLOGICAL MARKER - Magseed Localization  Left need ultrasound, need faxitron, need sentimag:        PROCEDURE IN DETAIL:   Kriss Pretty is a 63 y.o. female brought to the operating room for definitive surgical management of recent core biopsy revealing CSL. The patient has elected to undergo excisional biopsy for further evaluation. The patient was informed of the possible risks and complications of the procedure, including but not limited to anesthetic risks, bleeding, infection, and need for additional surgery. The patient concurred with the proposed plan, and has given informed consent. The site of surgery was properly noted/marked in the preoperative holding area.    The patient underwent informed consent. The MagSeed was placed in the upper outer quadrant of the left breast. The MagSeed localization films were reviewed.    She was then brought to the Operating Room and placed in the supine position. Anesthesia was administered. The left breast, anterior chest, arm and axilla were then prepped and draped in a sterile fashion.     Attention was turned to the left breast itself. An incision was made in the upper outer quadrant of the left breast over the anticipated tract of the seed. The specimen was dissected circumferentially around the seed and area of concern. The dissection was carried out  circumferentially to include the seed. The specimen was completely dissected free. The seed localized specimen was marked with sterile margin charms and submitted for specimen radiograph. Specimen radiograph confirmed the seed, clip and area of interest were within the specimen.    Within the lumpectomy cavity, hemostasis was achieved with cautery. The wound was irrigated until clear. There was no evidence of bleeding. It was closed in multiple layers with deep dermal and subcutaneous interrupted 3-0 vicryl sutures and a running 4-0 Monocryl subcuticular skin closure. Steristrips were applied followed by sterile dressings.    All instruments and sponge counts were correct at the end of the procedure.     Significant Surgical Tasks Conducted by the Assistant(s), if Applicable: The skilled assistance of the Physician Assistant, Julianna Cunningham PA-C, was necessary for the successful completion of this case. She was essential for proper positioning of the patient, manipulation of instruments, proper exposure, manipulation of tissue, and wound closure.     ESTIMATED BLOOD LOSS: 5ml    Implants: none    Specimens:   ID Type Source Tests Collected by Time Destination   A : left excisional breast biopsy @ 1 oclock (charms manjula margins) Tissue Breast, Left SPECIMEN TO PATHOLOGY Mila Hanson MD 9/15/2023 1205                 Condition: Good    Disposition: PACU - hemodynamically stable.    Attestation: I was present and scrubbed for the entire procedure.

## 2023-09-15 NOTE — INTERVAL H&P NOTE
The patient has been examined and the H&P has been reviewed:    I concur with the findings and no changes have occurred since H&P was written.    Surgery risks, benefits and alternative options discussed and understood by patient/family.    Mila Hanson MD      There are no hospital problems to display for this patient.

## 2023-09-15 NOTE — TRANSFER OF CARE
"Anesthesia Transfer of Care Note    Patient: Kriss Pretty    Procedure(s) Performed: Procedure(s) (LRB):  EXCISION,MASS,BREAST,USING RADIOLOGICAL MARKER - Magseed Localization  Left need ultrasound, need faxitron, need sentimag (Left)    Patient location: PACU    Anesthesia Type: general    Transport from OR: Transported from OR on room air with adequate spontaneous ventilation    Post pain: adequate analgesia    Post assessment: no apparent anesthetic complications    Post vital signs: stable    Level of consciousness: sedated    Nausea/Vomiting: no nausea/vomiting    Complications: none    Transfer of care protocol was followed      Last vitals:   Visit Vitals  BP (!) 150/81   Pulse 84   Temp 36.8 °C (98.2 °F) (Oral)   Resp 18   Ht 5' 3" (1.6 m)   Wt 103.9 kg (229 lb)   SpO2 99%   Breastfeeding No   BMI 40.57 kg/m²     "

## 2023-09-15 NOTE — ANESTHESIA POSTPROCEDURE EVALUATION
Anesthesia Post Evaluation    Patient: Kriss Pretty    Procedure(s) Performed: Procedure(s) (LRB):  EXCISION,MASS,BREAST,USING RADIOLOGICAL MARKER - Magseed Localization  Left need ultrasound, need faxitron, need sentimag (Left)    Final Anesthesia Type: general      Patient location during evaluation: PACU  Patient participation: Yes- Able to Participate  Level of consciousness: awake and alert, awake and oriented  Post-procedure vital signs: reviewed and stable  Pain management: adequate  Airway patency: patent    PONV status at discharge: No PONV  Anesthetic complications: no      Cardiovascular status: blood pressure returned to baseline, hemodynamically stable and stable  Respiratory status: unassisted, spontaneous ventilation and room air  Hydration status: euvolemic  Follow-up not needed.          Vitals Value Taken Time   /79 09/15/23 1330   Temp 36.6 °C (97.9 °F) 09/15/23 1330   Pulse 68 09/15/23 1335   Resp 16 09/15/23 1335   SpO2 98 % 09/15/23 1335   Vitals shown include unvalidated device data.      Event Time   Out of Recovery 13:38:00         Pain/Devin Score: Pain Rating Prior to Med Admin: 0 (9/15/2023  9:16 AM)  Devin Score: 9 (9/15/2023  1:30 PM)

## 2023-09-15 NOTE — BRIEF OP NOTE
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]    Procedure(s) (LRB):  EXCISION,MASS,BREAST,USING RADIOLOGICAL MARKER - Magseed Localization  Left need ultrasound, need faxitron, need sentimag (Left)    Anesthesia: General    Operative Findings: breast tissue    Estimated Blood Loss: * No values recorded between 9/15/2023 11:56 AM and 9/15/2023 12:30 PM *         Specimens:   Specimen (24h ago, onward)       Start     Ordered    09/15/23 1230  Specimen to Pathology  RELEASE UPON ORDERING        References:    Click here for ordering Quick Tip   Question:  Release to patient  Answer:  Immediate    09/15/23 1230                      Discharge Note    OUTCOME: Patient tolerated treatment/procedure well without complication and is now ready for discharge.    DISPOSITION: Home or Self Care    FINAL DIAGNOSIS:  <principal problem not specified>    FOLLOWUP: In clinic    DISCHARGE INSTRUCTIONS:    Discharge Procedure Orders   Diet general     Ice to affected area     Lifting restrictions     Remove dressing in 24 hours   Order Comments: Leave glue and steri strips until clinic visit     Call MD for:  temperature >100.4     Call MD for:  persistent nausea and vomiting     Call MD for:  severe uncontrolled pain     Call MD for:  difficulty breathing, headache or visual disturbances     Call MD for:  redness, tenderness, or signs of infection (pain, swelling, redness, odor or green/yellow discharge around incision site)     Call MD for:  hives     Call MD for:  persistent dizziness or light-headedness

## 2023-09-16 VITALS
RESPIRATION RATE: 161 BRPM | DIASTOLIC BLOOD PRESSURE: 83 MMHG | OXYGEN SATURATION: 97 % | TEMPERATURE: 98 F | SYSTOLIC BLOOD PRESSURE: 137 MMHG | WEIGHT: 229 LBS | HEIGHT: 63 IN | HEART RATE: 62 BPM | BODY MASS INDEX: 40.57 KG/M2

## 2023-09-19 LAB — PSYCHE PATHOLOGY RESULT: NORMAL

## 2023-09-25 ENCOUNTER — OFFICE VISIT (OUTPATIENT)
Dept: SURGERY | Facility: CLINIC | Age: 63
End: 2023-09-25
Payer: COMMERCIAL

## 2023-09-25 VITALS
HEART RATE: 96 BPM | RESPIRATION RATE: 20 BRPM | BODY MASS INDEX: 40.46 KG/M2 | WEIGHT: 228.38 LBS | HEIGHT: 63 IN | SYSTOLIC BLOOD PRESSURE: 149 MMHG | OXYGEN SATURATION: 99 % | DIASTOLIC BLOOD PRESSURE: 71 MMHG | TEMPERATURE: 98 F

## 2023-09-25 DIAGNOSIS — N64.89 COMPLEX SCLEROSING LESION OF LEFT BREAST: ICD-10-CM

## 2023-09-25 DIAGNOSIS — Z09 POSTOP CHECK: Primary | ICD-10-CM

## 2023-09-25 DIAGNOSIS — Z91.89 AT HIGH RISK FOR BREAST CANCER: ICD-10-CM

## 2023-09-25 PROCEDURE — 1160F RVW MEDS BY RX/DR IN RCRD: CPT | Mod: CPTII,S$GLB,, | Performed by: STUDENT IN AN ORGANIZED HEALTH CARE EDUCATION/TRAINING PROGRAM

## 2023-09-25 PROCEDURE — 3077F PR MOST RECENT SYSTOLIC BLOOD PRESSURE >= 140 MM HG: ICD-10-PCS | Mod: CPTII,S$GLB,, | Performed by: STUDENT IN AN ORGANIZED HEALTH CARE EDUCATION/TRAINING PROGRAM

## 2023-09-25 PROCEDURE — 1159F PR MEDICATION LIST DOCUMENTED IN MEDICAL RECORD: ICD-10-PCS | Mod: CPTII,S$GLB,, | Performed by: STUDENT IN AN ORGANIZED HEALTH CARE EDUCATION/TRAINING PROGRAM

## 2023-09-25 PROCEDURE — 99024 POSTOP FOLLOW-UP VISIT: CPT | Mod: S$GLB,,, | Performed by: STUDENT IN AN ORGANIZED HEALTH CARE EDUCATION/TRAINING PROGRAM

## 2023-09-25 PROCEDURE — 99999 PR PBB SHADOW E&M-EST. PATIENT-LVL IV: CPT | Mod: PBBFAC,,, | Performed by: STUDENT IN AN ORGANIZED HEALTH CARE EDUCATION/TRAINING PROGRAM

## 2023-09-25 PROCEDURE — 3077F SYST BP >= 140 MM HG: CPT | Mod: CPTII,S$GLB,, | Performed by: STUDENT IN AN ORGANIZED HEALTH CARE EDUCATION/TRAINING PROGRAM

## 2023-09-25 PROCEDURE — 3008F BODY MASS INDEX DOCD: CPT | Mod: CPTII,S$GLB,, | Performed by: STUDENT IN AN ORGANIZED HEALTH CARE EDUCATION/TRAINING PROGRAM

## 2023-09-25 PROCEDURE — 3078F PR MOST RECENT DIASTOLIC BLOOD PRESSURE < 80 MM HG: ICD-10-PCS | Mod: CPTII,S$GLB,, | Performed by: STUDENT IN AN ORGANIZED HEALTH CARE EDUCATION/TRAINING PROGRAM

## 2023-09-25 PROCEDURE — 1159F MED LIST DOCD IN RCRD: CPT | Mod: CPTII,S$GLB,, | Performed by: STUDENT IN AN ORGANIZED HEALTH CARE EDUCATION/TRAINING PROGRAM

## 2023-09-25 PROCEDURE — 99999 PR PBB SHADOW E&M-EST. PATIENT-LVL IV: ICD-10-PCS | Mod: PBBFAC,,, | Performed by: STUDENT IN AN ORGANIZED HEALTH CARE EDUCATION/TRAINING PROGRAM

## 2023-09-25 PROCEDURE — 1160F PR REVIEW ALL MEDS BY PRESCRIBER/CLIN PHARMACIST DOCUMENTED: ICD-10-PCS | Mod: CPTII,S$GLB,, | Performed by: STUDENT IN AN ORGANIZED HEALTH CARE EDUCATION/TRAINING PROGRAM

## 2023-09-25 PROCEDURE — 3008F PR BODY MASS INDEX (BMI) DOCUMENTED: ICD-10-PCS | Mod: CPTII,S$GLB,, | Performed by: STUDENT IN AN ORGANIZED HEALTH CARE EDUCATION/TRAINING PROGRAM

## 2023-09-25 PROCEDURE — 3078F DIAST BP <80 MM HG: CPT | Mod: CPTII,S$GLB,, | Performed by: STUDENT IN AN ORGANIZED HEALTH CARE EDUCATION/TRAINING PROGRAM

## 2023-09-25 PROCEDURE — 99024 PR POST-OP FOLLOW-UP VISIT: ICD-10-PCS | Mod: S$GLB,,, | Performed by: STUDENT IN AN ORGANIZED HEALTH CARE EDUCATION/TRAINING PROGRAM

## 2023-09-25 NOTE — PROGRESS NOTES
[facility] Breast Surg  Breast Surgical Oncology  New Patient Office Visit - H&P      Care Team: Patient Care Team:  Simran Farrell FNP as PCP - General (Family Medicine)  Pastora Jackson, RN as Registered Nurse (Diagnostic Radiology)       Chief Complaint:   Chief Complaint   Patient presents with    Post-op Evaluation     Post-op exam-patient reports redness around incision site, no swelling, no pain          Subjective:    Treatments:  L breast excisional biopsy 9/15/2023: complex sclerosing lesion    Interval History:  09/25/2023 - Kriss Pretty returns today for post-op. She is s/p l breast excisional biopsy for complex sclerosing lesion.  She is doing well postoperatively.  Minimal pain.  Denies any fever or chills.  She says maybe there is some erythema.      History of Present Illness:  Kriss Pretty is a pleasant 63 y.o.female who presents as a referral from Nick Farrell NP for high risk breast lesion. She denies any breast changes including new masses, skin changes, skin lesions, pain, nipple discharge or retraction.  Bilateral screening mammogram showed an area of focal asymmetry and architectural distortion in the left breast upper outer quadrant.  Ultrasound showed at 1:00, 7 cm from the nipple a 0.9 cm mass. Biopsy showed complex sclerosing lesion with sclerosing adenosis and prominent apocrine metaplastic changes.  She is never had any biopsies or breast surgery in the past.    Her family history is significant for maternal aunt with breast cancer in her late 50s.  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  She is present at the appointment today with her daughter.      Imaging:   Bilateral screening mammogram 06/26/2023: Density B, BI-RADS 0  focal asymmetry in the left breast upper-outer quadrant, middle to posterior depth.  Architectural distortion is associated with a focal asymmetry.  There is an asymmetry in the left breast medial  region, middle depth on CC view.  Left diagnostic mammogram 2023:  Persistent focal asymmetry with architectural distortion in the left breast upper-outer quadrant, middle to posterior depth.  Ultrasound:  Targeted evaluation of the 1:00 a.m. axis was performed revealing a 1:00, 7 cm from the nipple 0.9 x 0.8 x 0.9 cm irregular, hypoechoic spiculated mass with posterior acoustic shadowing.  This correlates with a focal asymmetry.  No suspicious left axillary lymphadenopathy.  BI-RADS 4.  Ultrasound-guided biopsy left breast 1:00, 7 cm from the nipple:  Successful.  Coil clip placed in good position.      Pathology:   Left breast 1:00, 7CFN mass: Complex sclerosing lesion with sclerosing adenosis and prominent apocrine metaplastic changes.    Left breast excisional biopsy 09/15/2023:  Complex sclerosing lesion with organizing previous biopsy changes.  No evidence of atypical hyperplasia or malignancy identified.      OB / GYN History   Menarche Onset:11  Menopause: Menopause: postmenopausal  Hormonal birth control (duration): 3years  Pregnancies: 3  Age at first child birth: 34  Child births: 2  Hysterectomy/Oophorectomy: no  HRT: no    Family History of Cancer (& age at diagnosis):  -   Family History   Problem Relation Age of Onset    Hypertension Father     Breast cancer Maternal Aunt 55 - 64        Lifestyle:  Height and Weight:   BMI: Body mass index is 40.46 kg/m².     Other:  # of breast biopsies (when and pathology results): 1 current  MG breast density: B  Prior thoracic RT: none  Genetic testing: No  Ashkenazi Taoist descent: No    Other History:     Past Medical History:   Diagnosis Date    Breast mass, left     Inactive Thyroid         Past Surgical History:   Procedure Laterality Date    BREAST BIOPSY  2023     SECTION      EXCISION, MASS, BREAST, USING RADIOLOGICAL MARKER Left 9/15/2023    Procedure: EXCISION,MASS,BREAST,USING RADIOLOGICAL MARKER - Magseed Localization  Left need  ultrasound, need faxitron, need sentimag;  Surgeon: Mila Hanson MD;  Location: [facility];  Service: General;  Laterality: Left;  need ultrasound, need faxitron, need sentimag        Social History     Socioeconomic History    Marital status:    Tobacco Use    Smoking status: Some Days     Current packs/day: 0.40     Types: Cigarettes    Smokeless tobacco: Never   Substance and Sexual Activity    Alcohol use: Yes     Comment: Occasionally    Drug use: Never    Sexual activity: Not Currently          There is no immunization history on file for this patient.     Medications/Allergies:       Current Outpatient Medications   Medication Instructions    busPIRone (BUSPAR) 7.5 mg, Oral, 2 times daily    cholecalciferol (vitamin D3) (VITAMIN D3) 1,000 Units, Oral, Daily, Take 2 capsules daily    fluticasone propionate (FLONASE) 50 mcg/actuation nasal spray 2 sprays, Each Nostril    levothyroxine (SYNTHROID) 88 mcg, Oral    loratadine (CLARITIN) 10 mg, Oral    oxyCODONE (ROXICODONE) 5 mg, Oral, Every 6 hours PRN       Review of patient's allergies indicates:  No Known Allergies     Review of Systems:      ROS    Constitutional: denies fevers, chills, weight loss  HEENT: denies blurry/double vision, changes in hearing, odynophagia, dysphagia  Respiratory: denies cough, shortness of breath  Cardiovascular: denies palpitations, swelling of the extremities  GI: denies abdominal pain, nausea/vomiting, hematochezia, frequent stools  : denies frequency, dysuria, flank pain, hematuria  Skin: denies new rashes  Neurological: denies muscular/sensory deficiencies, loss of coordination, headaches, memory changes  Endo: denies hair loss/thinning, nervousness, hot flashes, heat/cold intolerance, lumps in the neck area  Heme: denies easy bruising and fatigue  Psychological: denies anxious/depressive moods  Musculoskeletal: denies bony pain, muscle cramps, swollen joints       Objective/Physical Exam   Visit Vitals  BP (!)  "149/71 (BP Location: Right arm, Patient Position: Sitting, BP Method: Large (Automatic))   Pulse 96   Temp 97.8 °F (36.6 °C) (Oral)   Resp 20   Ht 5' 3" (1.6 m)   Wt 103.6 kg (228 lb 6.4 oz)   SpO2 99%   BMI 40.46 kg/m²        Physical Exam    General: The patient is awake, alert and oriented. The patient is well nourished. No acute distress.  Left Breast: Incision is clean, dry, intact. No erythema. Mild swelling.        Assessment and Plan     1. Postop check    2. Complex sclerosing lesion of left breast    3. At high risk for breast cancer      Kriss Pretty is a 63 y.o.female who is s/p left breast excisional biopsy for complex sclerosing lesion.  She is doing well postoperatively - no concern for infection on exam.  Pathology was discussed with her which shows a complex sclerosing lesion, no atypia or malignancy identified.  No further surgery needed. We discussed that this lesion does increase her risk for breast cancer in the future and I recommend she follow in our high-risk program.  We will go ahead and set her up for contrast-enhanced mammogram next June 2024 because she has severe claustrophobia with MRI.  We will plan to see her in 6 months for repeat breast exam.  She is working on quitting smoking.  All questions answered.        Mila Hanson MD  Breast Surgical Oncology     "

## 2024-03-25 ENCOUNTER — OFFICE VISIT (OUTPATIENT)
Dept: SURGERY | Facility: CLINIC | Age: 64
End: 2024-03-25
Payer: COMMERCIAL

## 2024-03-25 VITALS
HEIGHT: 63 IN | WEIGHT: 236.63 LBS | HEART RATE: 93 BPM | TEMPERATURE: 98 F | RESPIRATION RATE: 20 BRPM | DIASTOLIC BLOOD PRESSURE: 89 MMHG | OXYGEN SATURATION: 100 % | SYSTOLIC BLOOD PRESSURE: 147 MMHG | BODY MASS INDEX: 41.93 KG/M2

## 2024-03-25 DIAGNOSIS — Z91.89 AT HIGH RISK FOR BREAST CANCER: Primary | ICD-10-CM

## 2024-03-25 DIAGNOSIS — Z12.39 BREAST CANCER SCREENING, HIGH RISK PATIENT: ICD-10-CM

## 2024-03-25 DIAGNOSIS — Z80.3 FAMILY HISTORY OF BREAST CANCER: ICD-10-CM

## 2024-03-25 PROCEDURE — 1159F MED LIST DOCD IN RCRD: CPT | Mod: CPTII,S$GLB,, | Performed by: STUDENT IN AN ORGANIZED HEALTH CARE EDUCATION/TRAINING PROGRAM

## 2024-03-25 PROCEDURE — 3077F SYST BP >= 140 MM HG: CPT | Mod: CPTII,S$GLB,, | Performed by: STUDENT IN AN ORGANIZED HEALTH CARE EDUCATION/TRAINING PROGRAM

## 2024-03-25 PROCEDURE — 99999 PR PBB SHADOW E&M-EST. PATIENT-LVL IV: CPT | Mod: PBBFAC,,, | Performed by: STUDENT IN AN ORGANIZED HEALTH CARE EDUCATION/TRAINING PROGRAM

## 2024-03-25 PROCEDURE — 3008F BODY MASS INDEX DOCD: CPT | Mod: CPTII,S$GLB,, | Performed by: STUDENT IN AN ORGANIZED HEALTH CARE EDUCATION/TRAINING PROGRAM

## 2024-03-25 PROCEDURE — 1160F RVW MEDS BY RX/DR IN RCRD: CPT | Mod: CPTII,S$GLB,, | Performed by: STUDENT IN AN ORGANIZED HEALTH CARE EDUCATION/TRAINING PROGRAM

## 2024-03-25 PROCEDURE — 3079F DIAST BP 80-89 MM HG: CPT | Mod: CPTII,S$GLB,, | Performed by: STUDENT IN AN ORGANIZED HEALTH CARE EDUCATION/TRAINING PROGRAM

## 2024-03-25 PROCEDURE — 99214 OFFICE O/P EST MOD 30 MIN: CPT | Mod: S$GLB,,, | Performed by: STUDENT IN AN ORGANIZED HEALTH CARE EDUCATION/TRAINING PROGRAM

## 2024-03-25 RX ORDER — KETOCONAZOLE 20 MG/ML
SHAMPOO, SUSPENSION TOPICAL
COMMUNITY
Start: 2024-03-04

## 2024-03-25 RX ORDER — TRIAMCINOLONE ACETONIDE 0.25 MG/G
CREAM TOPICAL 2 TIMES DAILY
COMMUNITY
Start: 2024-03-04

## 2024-03-25 RX ORDER — MULTIVITAMIN
1 TABLET ORAL DAILY
COMMUNITY

## 2024-03-25 RX ORDER — KETOCONAZOLE 20 MG/G
CREAM TOPICAL 2 TIMES DAILY
COMMUNITY
Start: 2024-03-04

## 2024-03-25 NOTE — PROGRESS NOTES
[facility] Breast Surg  Breast Surgical Oncology  Est Patient Office Visit - H&P      Care Team: Patient Care Team:  Simran Farrell FNP as PCP - General (Family Medicine)       Chief Complaint:   Chief Complaint   Patient presents with    Follow-up     Patient reports no breast concerns          Subjective:    Treatments:  L breast excisional biopsy 9/15/2023: complex sclerosing lesion    Interval History:  03/25/2024 - Kriss Pretty returns today for high risk follow up.  She is doing well. She denies any breast changes including new masses, skin changes, skin lesions, pain, nipple discharge or retraction.  She states her incision does itch occasionally. No new imaging since her last visit.  She does not exercise regularly but has been contemplating buying a stationary bike. She is working on her diet and has cut meat out.     History of Present Illness:  Kriss Pretty is a pleasant 63 y.o.female who presents as a referral from Nick Farrell NP for high risk breast lesion. She denies any breast changes including new masses, skin changes, skin lesions, pain, nipple discharge or retraction.  Bilateral screening mammogram showed an area of focal asymmetry and architectural distortion in the left breast upper outer quadrant.  Ultrasound showed at 1:00, 7 cm from the nipple a 0.9 cm mass. Biopsy showed complex sclerosing lesion with sclerosing adenosis and prominent apocrine metaplastic changes.  She is never had any biopsies or breast surgery in the past.    Her family history is significant for maternal aunt with breast cancer in her late 50s.  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  She is present at the appointment today with her daughter.      Imaging:   Bilateral screening mammogram 06/26/2023: Density B, BI-RADS 0  focal asymmetry in the left breast upper-outer quadrant, middle to posterior depth.  Architectural distortion is associated with a focal  asymmetry.  There is an asymmetry in the left breast medial region, middle depth on CC view.  Left diagnostic mammogram 2023:  Persistent focal asymmetry with architectural distortion in the left breast upper-outer quadrant, middle to posterior depth.  Ultrasound:  Targeted evaluation of the 1:00 a.m. axis was performed revealing a 1:00, 7 cm from the nipple 0.9 x 0.8 x 0.9 cm irregular, hypoechoic spiculated mass with posterior acoustic shadowing.  This correlates with a focal asymmetry.  No suspicious left axillary lymphadenopathy.  BI-RADS 4.  Ultrasound-guided biopsy left breast 1:00, 7 cm from the nipple:  Successful.  Coil clip placed in good position.      Pathology:   Left breast 1:00, 7CFN mass: Complex sclerosing lesion with sclerosing adenosis and prominent apocrine metaplastic changes.    Left breast excisional biopsy 09/15/2023:  Complex sclerosing lesion with organizing previous biopsy changes.  No evidence of atypical hyperplasia or malignancy identified.      OB / GYN History   Menarche Onset:11  Menopause: Menopause: postmenopausal  Hormonal birth control (duration): 3years  Pregnancies: 3  Age at first child birth: 34  Child births: 2  Hysterectomy/Oophorectomy: no  HRT: no    Family History of Cancer (& age at diagnosis):  -   Family History   Problem Relation Age of Onset    Hypertension Father     Breast cancer Maternal Aunt 55 - 64        Lifestyle:  Height and Weight:   BMI: Body mass index is 41.91 kg/m².     Other:  # of breast biopsies (when and pathology results): 1 current  MG breast density: B  Prior thoracic RT: none  Genetic testing: No  Ashkenazi Jew descent: No    Other History:     Past Medical History:   Diagnosis Date    Breast mass, left     Inactive Thyroid         Past Surgical History:   Procedure Laterality Date    BREAST BIOPSY  2023     SECTION      EXCISION, MASS, BREAST, USING RADIOLOGICAL MARKER Left 9/15/2023    Procedure:  "EXCISION,MASS,BREAST,USING RADIOLOGICAL MARKER - [place] Localization  Left need ultrasound, need faxitron, need sentimag;  Surgeon: Mila Hanson MD;  Location: [facility] OR;  Service: General;  Laterality: Left;  need ultrasound, need faxitron, need sentimag        Social History     Socioeconomic History    Marital status:    Tobacco Use    Smoking status: Some Days     Current packs/day: 0.40     Types: Cigarettes    Smokeless tobacco: Never   Substance and Sexual Activity    Alcohol use: Yes     Comment: Occasionally    Drug use: Never    Sexual activity: Not Currently          There is no immunization history on file for this patient.     Medications/Allergies:       Current Outpatient Medications   Medication Instructions    busPIRone (BUSPAR) 7.5 mg, Oral, 2 times daily    cholecalciferol (vitamin D3) (VITAMIN D3) 1,000 Units, Oral, Daily, Take 2 capsules daily    fluticasone propionate (FLONASE) 50 mcg/actuation nasal spray 2 sprays, Each Nostril    ketoconazole (NIZORAL) 2 % cream Topical (Top), 2 times daily    ketoconazole (NIZORAL) 2 % shampoo Topical (Top), Twice weekly    levothyroxine (SYNTHROID) 88 mcg, Oral    loratadine (CLARITIN) 10 mg, Oral    multivitamin (ONE DAILY MULTIVITAMIN) per tablet 1 tablet, Oral, Daily    oxyCODONE (ROXICODONE) 5 mg, Oral, Every 6 hours PRN    triamcinolone acetonide 0.025% (KENALOG) 0.025 % cream Topical (Top), 2 times daily       Review of patient's allergies indicates:  No Known Allergies     Review of Systems:      ROS  See HPI for pertinent ROS.     Objective/Physical Exam   Visit Vitals  BP (!) 147/89 (BP Location: Right arm, Patient Position: Sitting, BP Method: Large (Automatic))   Pulse 93   Temp 97.5 °F (36.4 °C) (Oral)   Resp 20   Ht 5' 3" (1.6 m)   Wt 107.3 kg (236 lb 9.6 oz)   SpO2 100%   BMI 41.91 kg/m²          Physical Exam  General: The patient is awake, alert and oriented times three. The patient is well nourished. No acute distress.  Neck: The " neck is supple. The thyroid is not enlarged.  Musculoskeletal: The patient has a normal range of motion of her bilateral upper extremities.  Heart: Regular rate and rhythm, no murmurs.   Lung: No increased work of breathing. Clear breath sounds bilaterally.  Abdomen: The abdomen is soft, flat, nontender and nondistended.  Lymph nodes: There is no axillary, supraclavicular or cervical lymphadenopathy.  Breast:  Right:   On inspection there is no skin dimpling, rashes, retraction, erythema or skin abnormalities. The nipple areolar complex is normal with an everted nipple. The breasts move normally with movement of the pectoralis muscle. On palpation there are no dominant masses.  There is no tenderness. There is no nipple discharge.  Left:   Well healed incision in the UOQ.  On inspection there is no skin dimpling, rashes, retraction, erythema or skin abnormalities. The nipple areolar complex is normal with an everted nipple. The breasts move normally with movement of the pectoralis muscle. On palpation there are no dominant masses.  There is no tenderness. There is no nipple discharge.         Assessment and Plan     1. At high risk for breast cancer    2. Family history of breast cancer    3. Breast cancer screening, high risk patient      Kriss Pretty is a 63 y.o.female who is here today for high risk follow up.  There are no concerning findings on her breast exam. Today Kriss Pretty was plugged into the [employer] Breast [employer] risk calculator and her calculated risk of breast cancer based on Tyrer - Cuzick Breast Cancer Risk Model was 24%.  Radha 5 year risk is 1.6%.  She understands that >20% is considered high risk.  Today we again discussed risk factors associated with the development of breast cancer and risk reduction strategies.       PLAN:     Lifestyle - Healthy lifestyle guidelines were reviewed. She was encouraged to engage in regular exercise, maintain a healthy body weight, and  avoid excessive alcohol consumption. Healthy nutritional guidelines were also discussed.     Surveillance - She desires undergoing high risk screening but is very claustrophobic so will plan for contrast enhanced mammograms yearly. Screening mammogram is due June 2024 and is scheduled at [facility].       Recommend continuing with 2 clinical breast exams/year, one can be with OBGYN and one with the breast center    I recommended an OTC hydrocortisone cream to her L breast incision to help with the itching. This should improve with time.        The patient was encouraged to continue her self breast exam. If she notes any changes or has any concerns with her breast in the interim she was encouraged to call the breast center to schedule an appointment as soon as possible. All of her questions were answered.       Mila Hanson MD   Breast Surgical Oncology       I spent a total of 30 minutes on the day of the visit.  This includes face to face time and non-face to face time preparing to see the patient (eg, review of tests), obtaining and/or reviewing separately obtained history, documenting clinical information in the electronic or other health record, independently interpreting results and communicating results to the patient/family/caregiver, or care coordinator.

## 2024-06-27 ENCOUNTER — HOSPITAL ENCOUNTER (OUTPATIENT)
Dept: RADIOLOGY | Facility: HOSPITAL | Age: 64
Discharge: HOME | End: 2024-06-27
Attending: STUDENT IN AN ORGANIZED HEALTH CARE EDUCATION/TRAINING PROGRAM
Payer: COMMERCIAL

## 2024-06-27 DIAGNOSIS — Z91.89 AT HIGH RISK FOR BREAST CANCER: ICD-10-CM

## 2024-06-27 DIAGNOSIS — N64.89 COMPLEX SCLEROSING LESION OF LEFT BREAST: ICD-10-CM

## 2024-06-27 LAB
CREAT SERPL-MCNC: 1.03 MG/DL (ref 0.55–1.02)
GFR SERPLBLD CREATININE-BSD FMLA CKD-EPI: 60 ML/MIN/1.73/M2

## 2024-06-27 PROCEDURE — 77063 BREAST TOMOSYNTHESIS BI: CPT | Mod: 26,,, | Performed by: RADIOLOGY

## 2024-06-27 PROCEDURE — 77067 SCR MAMMO BI INCL CAD: CPT | Mod: 26,,, | Performed by: RADIOLOGY

## 2024-06-27 PROCEDURE — 25500020 PHARM REV CODE 255

## 2024-06-27 PROCEDURE — 77067 SCR MAMMO BI INCL CAD: CPT | Mod: TC

## 2024-06-27 PROCEDURE — 82565 ASSAY OF CREATININE: CPT | Performed by: STUDENT IN AN ORGANIZED HEALTH CARE EDUCATION/TRAINING PROGRAM

## 2024-06-27 RX ADMIN — IOHEXOL 100 ML: 350 INJECTION, SOLUTION INTRAVENOUS at 11:06

## 2024-06-27 RX ADMIN — IOHEXOL 50 ML: 350 INJECTION, SOLUTION INTRAVENOUS at 11:06

## 2025-03-24 ENCOUNTER — OFFICE VISIT (OUTPATIENT)
Dept: SURGERY | Facility: CLINIC | Age: 65
End: 2025-03-24
Payer: COMMERCIAL

## 2025-03-24 VITALS
OXYGEN SATURATION: 99 % | HEART RATE: 83 BPM | WEIGHT: 230 LBS | BODY MASS INDEX: 40.75 KG/M2 | SYSTOLIC BLOOD PRESSURE: 133 MMHG | RESPIRATION RATE: 20 BRPM | DIASTOLIC BLOOD PRESSURE: 76 MMHG | HEIGHT: 63 IN | TEMPERATURE: 98 F

## 2025-03-24 DIAGNOSIS — Z91.89 AT HIGH RISK FOR BREAST CANCER: ICD-10-CM

## 2025-03-24 DIAGNOSIS — Z12.39 OTHER SCREENING BREAST EXAMINATION: ICD-10-CM

## 2025-03-24 DIAGNOSIS — Z12.31 VISIT FOR SCREENING MAMMOGRAM: Primary | ICD-10-CM

## 2025-03-24 PROCEDURE — 3078F DIAST BP <80 MM HG: CPT | Mod: CPTII,S$GLB,, | Performed by: STUDENT IN AN ORGANIZED HEALTH CARE EDUCATION/TRAINING PROGRAM

## 2025-03-24 PROCEDURE — 1160F RVW MEDS BY RX/DR IN RCRD: CPT | Mod: CPTII,S$GLB,, | Performed by: STUDENT IN AN ORGANIZED HEALTH CARE EDUCATION/TRAINING PROGRAM

## 2025-03-24 PROCEDURE — 1101F PT FALLS ASSESS-DOCD LE1/YR: CPT | Mod: CPTII,S$GLB,, | Performed by: STUDENT IN AN ORGANIZED HEALTH CARE EDUCATION/TRAINING PROGRAM

## 2025-03-24 PROCEDURE — 1159F MED LIST DOCD IN RCRD: CPT | Mod: CPTII,S$GLB,, | Performed by: STUDENT IN AN ORGANIZED HEALTH CARE EDUCATION/TRAINING PROGRAM

## 2025-03-24 PROCEDURE — 3075F SYST BP GE 130 - 139MM HG: CPT | Mod: CPTII,S$GLB,, | Performed by: STUDENT IN AN ORGANIZED HEALTH CARE EDUCATION/TRAINING PROGRAM

## 2025-03-24 PROCEDURE — 3008F BODY MASS INDEX DOCD: CPT | Mod: CPTII,S$GLB,, | Performed by: STUDENT IN AN ORGANIZED HEALTH CARE EDUCATION/TRAINING PROGRAM

## 2025-03-24 PROCEDURE — 3288F FALL RISK ASSESSMENT DOCD: CPT | Mod: CPTII,S$GLB,, | Performed by: STUDENT IN AN ORGANIZED HEALTH CARE EDUCATION/TRAINING PROGRAM

## 2025-03-24 PROCEDURE — 99213 OFFICE O/P EST LOW 20 MIN: CPT | Mod: S$GLB,,, | Performed by: STUDENT IN AN ORGANIZED HEALTH CARE EDUCATION/TRAINING PROGRAM

## 2025-03-24 PROCEDURE — 99999 PR PBB SHADOW E&M-EST. PATIENT-LVL V: CPT | Mod: PBBFAC,,, | Performed by: STUDENT IN AN ORGANIZED HEALTH CARE EDUCATION/TRAINING PROGRAM

## 2025-03-24 PROCEDURE — 1126F AMNT PAIN NOTED NONE PRSNT: CPT | Mod: CPTII,S$GLB,, | Performed by: STUDENT IN AN ORGANIZED HEALTH CARE EDUCATION/TRAINING PROGRAM

## 2025-03-24 RX ORDER — MONTELUKAST SODIUM 10 MG/1
10 TABLET ORAL
COMMUNITY

## 2025-03-24 RX ORDER — CLINDAMYCIN PHOSPHATE 11.9 MG/ML
SOLUTION TOPICAL
COMMUNITY
Start: 2025-02-18

## 2025-03-24 RX ORDER — METHOCARBAMOL 750 MG/1
750 TABLET, FILM COATED ORAL EVERY 12 HOURS
COMMUNITY
Start: 2024-10-10

## 2025-03-24 NOTE — PROGRESS NOTES
[facility] Breast Surg  Breast Surgical Oncology  Est Patient Office Visit - H&P      Care Team: Patient Care Team:  Simran Farrell FNP as PCP - General (Family Medicine)       Chief Complaint:   Chief Complaint   Patient presents with    Follow-up     Patient reports no breast related concerns on today          Subjective:    Treatments:  L breast excisional biopsy 9/15/2023: complex sclerosing lesion    Interval History:  03/31/2025 - Kriss Pretty returns today for high risk follow up.  She is doing well. She denies any breast changes including new masses, skin changes, skin lesions, pain, nipple discharge or retraction.      History of Present Illness:  Kriss Pretty is a pleasant 64 y.o.female who presents as a referral from Nick Farrell NP for high risk breast lesion. She denies any breast changes including new masses, skin changes, skin lesions, pain, nipple discharge or retraction.  Bilateral screening mammogram showed an area of focal asymmetry and architectural distortion in the left breast upper outer quadrant.  Ultrasound showed at 1:00, 7 cm from the nipple a 0.9 cm mass. Biopsy showed complex sclerosing lesion with sclerosing adenosis and prominent apocrine metaplastic changes.  She is never had any biopsies or breast surgery in the past.    Her family history is significant for maternal aunt with breast cancer in her late 50s.  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  She is present at the appointment today with her daughter.      Imaging:   Bilateral screening mammogram 06/26/2023: Density B, BI-RADS 0  focal asymmetry in the left breast upper-outer quadrant, middle to posterior depth.  Architectural distortion is associated with a focal asymmetry.  There is an asymmetry in the left breast medial region, middle depth on CC view.  Left diagnostic mammogram 07/25/2023:  Persistent focal asymmetry with architectural distortion in the left  breast upper-outer quadrant, middle to posterior depth.  Ultrasound:  Targeted evaluation of the 1:00 a.m. axis was performed revealing a 1:00, 7 cm from the nipple 0.9 x 0.8 x 0.9 cm irregular, hypoechoic spiculated mass with posterior acoustic shadowing.  This correlates with a focal asymmetry.  No suspicious left axillary lymphadenopathy.  BI-RADS 4.  Ultrasound-guided biopsy left breast 1:00, 7 cm from the nipple:  Successful.  Coil clip placed in good position.  Bilateral screening mammogram with contrast 2024:  No suspicious enhancement in the right breast.  Postsurgical scarring upper-outer left breast, middle depth.  BI-RADS 2.      Pathology:   Left breast 1:00, 7CFN mass: Complex sclerosing lesion with sclerosing adenosis and prominent apocrine metaplastic changes.    Left breast excisional biopsy 09/15/2023:  Complex sclerosing lesion with organizing previous biopsy changes.  No evidence of atypical hyperplasia or malignancy identified.      OB / GYN History   Menarche Onset:11  Menopause: Menopause: postmenopausal  Hormonal birth control (duration): 3years  Pregnancies: 3  Age at first child birth: 34  Child births: 2  Hysterectomy/Oophorectomy: no  HRT: no    Family History of Cancer (& age at diagnosis):  -   Family History   Problem Relation Name Age of Onset    Hypertension Father      Breast cancer Maternal Aunt  55 - 64        Lifestyle:  Height and Weight:   BMI: Body mass index is 40.74 kg/m².     Other:  # of breast biopsies (when and pathology results): 1 current  MG breast density: B  Prior thoracic RT: none  Genetic testing: No  Ashkenazi Sabianist descent: No    Other History:     Past Medical History:   Diagnosis Date    Breast mass, left     Inactive Thyroid         Past Surgical History:   Procedure Laterality Date    BREAST BIOPSY  2023     SECTION      EXCISION, MASS, BREAST, USING RADIOLOGICAL MARKER Left 9/15/2023    Procedure: EXCISION,MASS,BREAST,USING  "RADIOLOGICAL MARKER - [place] Localization  Left need ultrasound, need faxitron, need sentimag;  Surgeon: Mila Hanson MD;  Location: [facility] OR;  Service: General;  Laterality: Left;  need ultrasound, need faxitron, need sentimag        Social History     Socioeconomic History    Marital status:    Tobacco Use    Smoking status: Some Days     Current packs/day: 0.40     Types: Cigarettes    Smokeless tobacco: Never    Tobacco comments:     03/24/25-patient has cut back on the amount of cigarettes she's smoking. She's smoking 4-5 cigarettes per day.   Substance and Sexual Activity    Alcohol use: Yes     Comment: Occasionally    Drug use: Never    Sexual activity: Not Currently          There is no immunization history on file for this patient.     Medications/Allergies:       Current Outpatient Medications   Medication Instructions    busPIRone (BUSPAR) 7.5 mg, 2 times daily    cholecalciferol (vitamin D3) (VITAMIN D3) 1,000 Units, Daily    clindamycin (CLEOCIN T) 1 % external solution Apply topically.    fluticasone propionate (FLONASE) 50 mcg/actuation nasal spray 2 sprays    ketoconazole (NIZORAL) 2 % cream 2 times daily    ketoconazole (NIZORAL) 2 % shampoo Twice weekly    levothyroxine (SYNTHROID) 88 mcg    loratadine (CLARITIN) 10 mg    methocarbamoL (ROBAXIN) 750 mg, Every 12 hours    montelukast (SINGULAIR) 10 mg    multivitamin (ONE DAILY MULTIVITAMIN) per tablet 1 tablet, Daily    oxyCODONE (ROXICODONE) 5 mg, Oral, Every 6 hours PRN    triamcinolone acetonide 0.025% (KENALOG) 0.025 % cream 2 times daily       Review of patient's allergies indicates:  No Known Allergies     Review of Systems:      ROS  See HPI for pertinent ROS.     Objective/Physical Exam   Visit Vitals  /76 (BP Location: Left arm, Patient Position: Sitting)   Pulse 83   Temp 97.9 °F (36.6 °C) (Oral)   Resp 20   Ht 5' 3" (1.6 m)   Wt 104.3 kg (230 lb)   SpO2 99%   BMI 40.74 kg/m²          Physical Exam  General: The patient " is awake, alert and oriented times three. The patient is well nourished. No acute distress.  Neck: The neck is supple.   Musculoskeletal: The patient has a normal range of motion of her bilateral upper extremities.  Heart: Regular rate and rhythm, no murmurs.   Lung: No increased work of breathing. Clear breath sounds bilaterally.  Lymph nodes: There is no axillary, supraclavicular or cervical lymphadenopathy.  Breast:  Right:   On inspection there is no skin dimpling, rashes, retraction, erythema or skin abnormalities. The nipple areolar complex is normal with an everted nipple. The breasts move normally with movement of the pectoralis muscle. On palpation there are no dominant masses.  There is no tenderness. There is no nipple discharge.  Left:   Well healed incision in the UOQ.  On inspection there is no skin dimpling, rashes, retraction, erythema or skin abnormalities. The nipple areolar complex is normal with an everted nipple. The breasts move normally with movement of the pectoralis muscle. On palpation there are no dominant masses.  There is no tenderness. There is no nipple discharge.         Assessment and Plan     1. At high risk for breast cancer    2. Visit for screening mammogram  - Mammo Digital Screening Bilat w/ Gregorio (XPD); Future    3. Other screening breast examination  - US BREAST SCREENING BILATERAL; Future      Kriss Pretty is a 64 y.o.female who is here today for high risk follow up.  There are no concerning findings on her breast exam. Today Kriss Pretty was plugged into the [employer] Breast [employer] risk calculator and her calculated risk of breast cancer based on Tyrer - Cuzick Breast Cancer Risk Model was 24%.  Radha 5 year risk is 1.6%.  She understands that >20% is considered high risk.  Today we again discussed risk factors associated with the development of breast cancer and risk reduction strategies.       PLAN:     Lifestyle - Healthy lifestyle guidelines were  reviewed. She was encouraged to engage in regular exercise, maintain a healthy body weight, and avoid excessive alcohol consumption. Healthy nutritional guidelines were also discussed.     Surveillance - She desires undergoing high risk screening but is very claustrophobic and is unable to do MRI.  Bilateral screening mammogram due in June of 2025.    Recommend continuing with 2 clinical breast exams/year, one with your OBGYN and one with the breast center     The patient was encouraged to continue her self breast exam. If she notes any changes or has any concerns with her breast in the interim she was encouraged to call the breast center to schedule an appointment as soon as possible. All of her questions were answered.  Return to clinic in 1 year.      Mila Hanson MD   Breast Surgical Oncology       I spent a total of 25 minutes on the day of the visit.  This includes face to face time and non-face to face time preparing to see the patient (eg, review of tests), obtaining and/or reviewing separately obtained history, documenting clinical information in the electronic or other health record, independently interpreting results and communicating results to the patient/family/caregiver, or care coordinator.

## 2025-06-30 ENCOUNTER — HOSPITAL ENCOUNTER (OUTPATIENT)
Dept: RADIOLOGY | Facility: HOSPITAL | Age: 65
Discharge: HOME | End: 2025-06-30
Attending: STUDENT IN AN ORGANIZED HEALTH CARE EDUCATION/TRAINING PROGRAM
Payer: COMMERCIAL

## 2025-06-30 DIAGNOSIS — Z12.39 OTHER SCREENING BREAST EXAMINATION: ICD-10-CM

## 2025-06-30 DIAGNOSIS — Z12.31 VISIT FOR SCREENING MAMMOGRAM: ICD-10-CM

## 2025-06-30 PROCEDURE — 77067 SCR MAMMO BI INCL CAD: CPT | Mod: 26,,, | Performed by: RADIOLOGY

## 2025-06-30 PROCEDURE — 77063 BREAST TOMOSYNTHESIS BI: CPT | Mod: 26,,, | Performed by: RADIOLOGY

## 2025-06-30 PROCEDURE — 76641 ULTRASOUND BREAST COMPLETE: CPT | Mod: 26,50,, | Performed by: RADIOLOGY

## 2025-06-30 PROCEDURE — 76641 ULTRASOUND BREAST COMPLETE: CPT | Mod: TC,50

## 2025-06-30 PROCEDURE — 77063 BREAST TOMOSYNTHESIS BI: CPT | Mod: TC

## (undated) DEVICE — GAUZE SPONGE 4X4 12PLY

## (undated) DEVICE — ELECTRODE BLADE INSULATED 1 IN

## (undated) DEVICE — SOL IRRI STRL WATER 1000ML

## (undated) DEVICE — SUT 2/0 30IN SILK BLK BRAI

## (undated) DEVICE — COVER PROBE WITH BAND 6X96IN

## (undated) DEVICE — ADHESIVE MASTISOL VIAL 48/BX

## (undated) DEVICE — NDL HYPO REG 25G X 1 1/2

## (undated) DEVICE — FLEXFIT ULTRA-HIGH COVERAGE BRA

## (undated) DEVICE — GLOVE PROTEXIS PI SYN SURG 6.0

## (undated) DEVICE — DRESSING TRANS 4X4 3/4

## (undated) DEVICE — SUPPORT ULNA NERVE PROTECTOR

## (undated) DEVICE — NDL SYR 10ML 18X1.5 LL BLUNT

## (undated) DEVICE — ELECTRODE PATIENT RETURN DISP

## (undated) DEVICE — GOWN X-LG STERILE BACK

## (undated) DEVICE — Device

## (undated) DEVICE — SUT VICRYL 3-0 27 SH

## (undated) DEVICE — DRAPE FLUID WARMER ORS 44X44IN

## (undated) DEVICE — PENCIL SMOKE EVAC ROCKER 70MM

## (undated) DEVICE — SPONGE LAP STRL 18X18IN

## (undated) DEVICE — CLOSURE SKIN STERI STRIP 1/4X4

## (undated) DEVICE — RETRACTOR ONETRAC SNGL 90INX22